# Patient Record
Sex: FEMALE | Race: WHITE | NOT HISPANIC OR LATINO | Employment: FULL TIME | URBAN - NONMETROPOLITAN AREA
[De-identification: names, ages, dates, MRNs, and addresses within clinical notes are randomized per-mention and may not be internally consistent; named-entity substitution may affect disease eponyms.]

---

## 2017-04-03 ENCOUNTER — HISTORY (OUTPATIENT)
Dept: FAMILY MEDICINE | Facility: OTHER | Age: 52
End: 2017-04-03

## 2017-04-03 ENCOUNTER — HOSPITAL ENCOUNTER (OUTPATIENT)
Dept: RADIOLOGY | Facility: OTHER | Age: 52
End: 2017-04-03
Attending: FAMILY MEDICINE

## 2017-04-03 ENCOUNTER — OFFICE VISIT - GICH (OUTPATIENT)
Dept: FAMILY MEDICINE | Facility: OTHER | Age: 52
End: 2017-04-03

## 2017-04-03 DIAGNOSIS — N81.10 CYSTOCELE, UNSPECIFIED (CODE): ICD-10-CM

## 2017-04-03 DIAGNOSIS — Z00.00 ENCOUNTER FOR GENERAL ADULT MEDICAL EXAMINATION WITHOUT ABNORMAL FINDINGS: ICD-10-CM

## 2017-04-03 DIAGNOSIS — Z12.4 ENCOUNTER FOR SCREENING FOR MALIGNANT NEOPLASM OF CERVIX: ICD-10-CM

## 2017-04-03 DIAGNOSIS — Z98.1 ARTHRODESIS STATUS: ICD-10-CM

## 2017-04-03 DIAGNOSIS — F41.1 GENERALIZED ANXIETY DISORDER: ICD-10-CM

## 2017-04-03 DIAGNOSIS — N95.0 POSTMENOPAUSAL BLEEDING: ICD-10-CM

## 2017-04-03 DIAGNOSIS — N39.490 OVERFLOW INCONTINENCE: ICD-10-CM

## 2017-04-03 DIAGNOSIS — Z12.31 ENCOUNTER FOR SCREENING MAMMOGRAM FOR MALIGNANT NEOPLASM OF BREAST: ICD-10-CM

## 2017-04-03 DIAGNOSIS — M54.50 LOW BACK PAIN: ICD-10-CM

## 2017-04-03 DIAGNOSIS — Z98.890 OTHER SPECIFIED POSTPROCEDURAL STATES: ICD-10-CM

## 2017-04-03 LAB
A/G RATIO - HISTORICAL: 1.4 (ref 1–2)
ABSOLUTE BASOPHILS - HISTORICAL: 0.1 THOU/CU MM
ABSOLUTE EOSINOPHILS - HISTORICAL: 0.2 THOU/CU MM
ABSOLUTE LYMPHOCYTES - HISTORICAL: 2.2 THOU/CU MM (ref 0.9–2.9)
ABSOLUTE MONOCYTES - HISTORICAL: 0.4 THOU/CU MM
ABSOLUTE NEUTROPHILS - HISTORICAL: 2.6 THOU/CU MM (ref 1.7–7)
ALBUMIN SERPL-MCNC: 4.4 G/DL (ref 3.5–5.7)
ALP SERPL-CCNC: 72 IU/L (ref 34–104)
ALT (SGPT) - HISTORICAL: 15 IU/L (ref 7–52)
AMORPHOUS - HISTORICAL: PRESENT
ANION GAP - HISTORICAL: 7 (ref 5–18)
AST SERPL-CCNC: 17 IU/L (ref 13–39)
BACTERIA URINE: ABNORMAL BACTERIA/HPF
BASOPHILS # BLD AUTO: 1.5 %
BILIRUB SERPL-MCNC: 0.5 MG/DL (ref 0.3–1)
BILIRUB UR QL: NEGATIVE
BUN SERPL-MCNC: 13 MG/DL (ref 7–25)
BUN/CREAT RATIO - HISTORICAL: 22
C-REACTIVE PROTEIN - HISTORICAL: <1 MG/DL
CALCIUM SERPL-MCNC: 9.7 MG/DL (ref 8.6–10.3)
CHLORIDE SERPLBLD-SCNC: 105 MMOL/L (ref 98–107)
CHOL/HDL RATIO - HISTORICAL: 4.52
CHOLESTEROL TOTAL: 226 MG/DL
CLARITY, URINE: ABNORMAL CLARITY
CO2 SERPL-SCNC: 26 MMOL/L (ref 21–31)
COLOR UR: YELLOW COLOR
CREAT SERPL-MCNC: 0.59 MG/DL (ref 0.7–1.3)
EOSINOPHIL NFR BLD AUTO: 3.9 %
EPITHELIAL CELLS: ABNORMAL EPI/HPF
ERYTHROCYTE [DISTWIDTH] IN BLOOD BY AUTOMATED COUNT: 12.2 % (ref 11.5–15.5)
ERYTHROCYTE [SEDIMENTATION RATE] IN BLOOD: 13 MM/HR
GFR IF NOT AFRICAN AMERICAN - HISTORICAL: >60 ML/MIN/1.73M2
GLOBULIN - HISTORICAL: 3.1 G/DL (ref 2–3.7)
GLUCOSE SERPL-MCNC: 83 MG/DL (ref 70–105)
GLUCOSE URINE: NEGATIVE MG/DL
HCT VFR BLD AUTO: 43.8 % (ref 33–51)
HDLC SERPL-MCNC: 50 MG/DL (ref 23–92)
HEMOGLOBIN: 14.6 G/DL (ref 12–16)
KETONES UR QL: NEGATIVE MG/DL
LDLC SERPL CALC-MCNC: 116 MG/DL
LEUKOCYTE ESTERASE URINE: NEGATIVE
LYMPHOCYTES NFR BLD AUTO: 39.4 % (ref 20–44)
MCH RBC QN AUTO: 28.9 PG (ref 26–34)
MCHC RBC AUTO-ENTMCNC: 33.3 G/DL (ref 32–36)
MCV RBC AUTO: 87 FL (ref 80–100)
MONOCYTES NFR BLD AUTO: 8.1 %
NEUTROPHILS NFR BLD AUTO: 47.2 % (ref 42–72)
NITRITE UR QL STRIP: NEGATIVE
NON-HDL CHOLESTEROL - HISTORICAL: 176 MG/DL
OCCULT BLOOD,URINE - HISTORICAL: NEGATIVE
PATIENT STATUS - HISTORICAL: ABNORMAL
PH UR: 8 [PH]
PLATELET # BLD AUTO: 380 THOU/CU MM (ref 140–440)
PMV BLD: 7.5 FL (ref 6.5–11)
POTASSIUM SERPL-SCNC: 3.9 MMOL/L (ref 3.5–5.1)
PROT SERPL-MCNC: 7.5 G/DL (ref 6.4–8.9)
PROTEIN QUALITATIVE,URINE - HISTORICAL: NEGATIVE MG/DL
RBC - HISTORICAL: ABNORMAL /HPF
RED BLOOD COUNT - HISTORICAL: 5.04 MIL/CU MM (ref 4–5.2)
SODIUM SERPL-SCNC: 138 MMOL/L (ref 133–143)
SP GR UR STRIP: 1.01
TRIGL SERPL-MCNC: 301 MG/DL
TSH - HISTORICAL: 2.36 UIU/ML (ref 0.34–5.6)
UROBILINOGEN,QUALITATIVE - HISTORICAL: NORMAL EU/DL
WBC - HISTORICAL: ABNORMAL /HPF
WHITE BLOOD COUNT - HISTORICAL: 5.6 THOU/CU MM (ref 4.5–11)

## 2017-04-04 ENCOUNTER — HOSPITAL ENCOUNTER (OUTPATIENT)
Dept: RADIOLOGY | Facility: OTHER | Age: 52
End: 2017-04-04
Attending: FAMILY MEDICINE

## 2017-04-04 ENCOUNTER — HISTORY (OUTPATIENT)
Dept: RADIOLOGY | Facility: OTHER | Age: 52
End: 2017-04-04

## 2017-04-04 DIAGNOSIS — Z12.4 ENCOUNTER FOR SCREENING FOR MALIGNANT NEOPLASM OF CERVIX: ICD-10-CM

## 2017-04-04 DIAGNOSIS — N95.0 POSTMENOPAUSAL BLEEDING: ICD-10-CM

## 2017-04-04 DIAGNOSIS — Z12.31 ENCOUNTER FOR SCREENING MAMMOGRAM FOR MALIGNANT NEOPLASM OF BREAST: ICD-10-CM

## 2017-04-04 DIAGNOSIS — Z00.00 ENCOUNTER FOR GENERAL ADULT MEDICAL EXAMINATION WITHOUT ABNORMAL FINDINGS: ICD-10-CM

## 2017-04-06 ENCOUNTER — AMBULATORY - GICH (OUTPATIENT)
Dept: FAMILY MEDICINE | Facility: OTHER | Age: 52
End: 2017-04-06

## 2017-08-24 ENCOUNTER — AMBULATORY - GICH (OUTPATIENT)
Dept: SCHEDULING | Facility: OTHER | Age: 52
End: 2017-08-24

## 2017-08-25 ENCOUNTER — AMBULATORY - GICH (OUTPATIENT)
Dept: SCHEDULING | Facility: OTHER | Age: 52
End: 2017-08-25

## 2018-01-04 NOTE — PROGRESS NOTES
Patient Information     Patient Name MRN Sex     Camilla Don [id number] Female 1965      Progress Notes signed by Jarrod Liu MD at 2017  2:46 PM      Author:  Jarrod Liu MD Service:  (none) Author Type:  Physician     Filed:  2017  2:46 PM Encounter Date:  2017 Status:  Signed     :  Jarrod Liu MD (Physician)            -  2017        CAMILLA DON  [id number] [address]      RE:  CAMILLA DON  MR#:  [id number]  :  1965    Dear Camilla,    I released your ultrasound report, and [facility] work [facility]. I just wanted to make sure you understand that the lining of the uterus was not seen well on that ultrasound. Because of that, I think you should see one of the gynecologists and have an endometrial biopsy done. This is a very simple procedure that can be done easily in the clinic. It is much the same as having a Pap test done nor having an IUD inserted.     If you would like to proceed, which I would suggest, please contact me and I can arrange an appointment for you.     Sincerely yours,         JARROD LIU MD    WR/km  Voice Job ID: [id number]  Text Job ID:  [id number]    cc:  2017  RE:  CAMILLA DON  MR#:  [id number]  Page 1

## 2018-01-04 NOTE — NURSING NOTE
Patient Information     Patient Name MRN Pat Hadley [id number] Female 1965      Nursing Note by Gissel Moreno at 4/3/2017 12:45 PM     Author:  Gissel Moreno Service:  (none) Author Type:  (none)     Filed:  4/3/2017  1:08 PM Encounter Date:  4/3/2017 Status:  Signed     :  Gissel Moreno            Patient presents to the clinic today for a px.  Gissel Moreno ....................  4/3/2017   12:58 PM

## 2018-01-04 NOTE — PROGRESS NOTES
Patient Information     Patient Name MRN Sex Pat Aiken [id number] Female 1965      Progress Notes by Mellisa Iglesias R.T. (ARRT) at 2017  3:17 PM     Author:  Mellisa Iglesias R.T. (ARRT) Service:  (none) Author Type:  (none)     Filed:  2017  3:17 PM Date of Service:  2017  3:17 PM Status:  Signed     :  Mellisa Iglesias R.T. (CRISPINT) ([facility] - Registered Radiologic Technologist)            Falls Risk Criteria:    Age 65 and older or under age 4        Sensory deficits    Poor vision    Use of ambulatory aides    Impaired judgment    Unable to walk independently    Meets High Risk criteria for falls:  no

## 2018-01-04 NOTE — PROGRESS NOTES
Patient Information     Patient Name MRN Pat Hadley [id number] Female 1965      Progress Notes by Darrion James MD at 4/3/2017 12:45 PM     Author:  Darrion James MD Service:  (none) Author Type:  Physician     Filed:  2017 12:19 PM Encounter Date:  4/3/2017 Status:  Signed     :  Darrion James MD (Physician)            HPI-Comes in today for comprehensive evaluation.  She has multiple things that she would like to discuss. First of all, she felt perimenopausal at about the age of 44. In 2014 she had a menstrual period in and had no more for another year and had multiple hot flashes. They restarted again in 2015 and were very heavy, and then continued for a couple of months and then stopped again for another year. Shortly after 2016 the menstrual period came again, then it stopped, and then since January she's had 3 normal periods a month apart. They are fairly heavy. There is no cramping or other associated symptoms. Twice, however, she has got a whole year without a menstrual period and did have hot flashes.    Secondly she has back pain which is constant. It seems to be worse when the menstrual period comes. She gets a clicking sensation sometimes in the back and gets pain that radiates to the hips bilaterally but not radicular into the legs. She had back surgery about 20 years ago with fusion of L3-4. She's had intermittent pain since that time but it's gradually gotten worse.    She's been doing Kirby exercises regularly but her bladder does leak in the morning and she has some overflow incontinence. She had 3 normal deliveries without complication. She has not had dysuria, frequency, but she does have urgency. She has not noted any bulging into the vagina.    She does have intermittent anxiety which is fairly well controlled just with meditation and walking. She can walk about 2 miles per day before her back 6 to heard.    She's had no  cardiopulmonary problems or any other associated symptoms.    ROS:  See HPI.  Other than as noted above. She has no other significant complaints, specifically no ENT, cardiopulmonary, GI, or other rheumatologic, hematologic, skin, lymph, neurologic or psychiatric issues.    Past Medical History:     Diagnosis  Date     Family history of colon cancer 10/2/2014     No Significant Past Medical History      Past Surgical History:      Procedure  Laterality Date     APPENDECTOMY  1985     CHOLECYSTECTOMY  1987     [place] COLONOSCOPY REMOVE JAYLENE POLYP LESN HOT BPSY FORCEP  10/07/2014    hyperplastic polyp--repeat 10 years       SPINAL FUSION  06/1997    L3/L4       TUBAL LIGATION       Family History      Problem  Relation Age of Onset     Cancer-colon Maternal Grandfather      Cancer-colon Maternal Aunt      Cancer-colon Maternal Aunt      Cancer-breast No Family History      Social History       Substance Use Topics         Smoking status:   Former Smoker     Smokeless tobacco:   Never Used     Alcohol use   Yes      Comment: occasional wine      No Known Allergies    Current Outpatient Prescriptions:      soy isofla-blk cohosh-mag bark (ESTROVEN) 155 mg cap, Take  by mouth once daily., Disp: , Rfl: 0     vit B complex with C #13-FA-D3 1-1,750 mg-unit TbDL, Take  by mouth once daily., Disp: , Rfl: 0  Medications have been reviewed by me and are current to the best of my knowledge and ability.      EXAM:she is a pleasant healthy-appearing 51 y.o. female in no apparent distress.  She answers questions appropriately and appears well-kempt.  HEENT exam is within normal limits.  .  Neck is supple with good carotid pulses.  No bruits are heard.  No thyromegaly or adenopathy.  Lungs are clear with good air movement.  No rales, rhonchi, or wheezes are heard--  Breasts are normal in appearance, palpably free of masses.    There is no skin dimpling or nipple changes and axilla are negative.  Cardiac exam reveals a regular rhythm with  no murmur or gallop appreciated.  Peripheral pulses are equal and strong. There is no peripheral edema.  Abdomen-soft and nontender with no hepatosplenomegaly or masses.  Bowel sounds are normal and no bruits are heard  Back is straight with no scoliosis or CVA tenderness.  Surgical scar is well-healed. There is fairly significant low back tenderness directly over the scar at all levels.   exam reveals normal external genitalia.  Vagina is normal. With Valsalva, there is a moderate cystocele. Cervix is parous, but normal in appearance. Pap smear was done..  Uterus is midline, mobile and nontender..  Both adnexa are normal.     Extremities no edema. Good distal pulses. Straight leg raising is negative.  Neurologic exam is intact and nonfocal  Skin is free of significant lesions.  No rashes are seen  Lymph nodes are nonpalpable in cervical, axillary, and inguinal areas.  Psychiatric: Alert and oriented with normal mood and affect    Lab-labs are pending  Imaging-lumbosacral spine x-rays were obtained and show an intact fusion. Above the fusion there is degeneration of the disc. Mammogram is scheduled.     Assessment-preventive healthcare-normal exam. Mammogram is scheduled. Colonoscopy was done in 2014 and is due again in 2024. Tetanus is up-to-date. She declined flu vaccine or Prevnar.    History of back surgery and persistent back pain. X-rays do show an abnormality of the disc above the level of her fusion. Recommended physical therapy and check lab work and if symptoms unimproved consider further evaluation. Reviewed her back x-rays with her.    History of irregular bleeding following a year of no bleeding-postmenopausal bleeding. Recommended proceeding with ultrasound and a Pap test was done.    History of overflow incontinence and cystocele. At this point her symptoms are manageable and she would just like to watch and wait and see what happens but I did suggest GYN consultation if symptoms  worsen.    Plan-laboratory studies done, she'll be notified of the result. The same is true of Pap test, pelvic ultrasound, and mammogram. We will follow-up again pending completion of all of these studies.    Darrion James MD ....................  4/4/2017   12:10 PM

## 2018-01-17 ENCOUNTER — COMMUNICATION - GICH (OUTPATIENT)
Dept: FAMILY MEDICINE | Facility: OTHER | Age: 53
End: 2018-01-17

## 2018-01-18 ENCOUNTER — AMBULATORY - GICH (OUTPATIENT)
Dept: RADIOLOGY | Facility: OTHER | Age: 53
End: 2018-01-18

## 2018-01-27 VITALS
HEIGHT: 61 IN | WEIGHT: 169 LBS | BODY MASS INDEX: 31.91 KG/M2 | DIASTOLIC BLOOD PRESSURE: 84 MMHG | SYSTOLIC BLOOD PRESSURE: 138 MMHG

## 2018-01-29 ENCOUNTER — DOCUMENTATION ONLY (OUTPATIENT)
Dept: FAMILY MEDICINE | Facility: OTHER | Age: 53
End: 2018-01-29

## 2018-01-29 PROBLEM — M54.50 LUMBAR SPINE PAIN: Status: ACTIVE | Noted: 2017-04-03

## 2018-01-29 PROBLEM — Z98.1 S/P LUMBAR SPINAL FUSION: Status: ACTIVE | Noted: 2017-04-03

## 2018-02-13 NOTE — TELEPHONE ENCOUNTER
Patient Information     Patient Name MRN Pat Hadley [id number] Female 1965      Telephone Encounter by Юлия Ortiz RN at 2018 10:48 AM     Author:  Юлия Ortiz RN  Service:  (none) Author Type:  NURS- Registered Nurse     Filed:  2018 11:50 AM  Encounter Date:  2018 Status:  Addendum     :  Юлия Ortiz RN (NURS- Registered Nurse)        Related Notes: Original Note by Юлия Ortiz RN (NURS- Registered Nurse) filed at 2018 10:50 AM            Left message with pt.  Refill request for Metoprolol with no record on file.  Pt has not been  Seen since .  Left message to call back  ....................Юлия Ortiz RN  2018   10:50 AM       Pt states due to insurance she had to switch clinics and medication has been filled by other primary.  Юлия Ortiz RN ....................  2018   11:50 AM

## 2019-06-04 ENCOUNTER — TRANSFERRED RECORDS (OUTPATIENT)
Dept: HEALTH INFORMATION MANAGEMENT | Facility: CLINIC | Age: 54
End: 2019-06-04

## 2020-05-01 ENCOUNTER — ALLIED HEALTH/NURSE VISIT (OUTPATIENT)
Dept: FAMILY MEDICINE | Facility: OTHER | Age: 55
End: 2020-05-01
Attending: FAMILY MEDICINE
Payer: COMMERCIAL

## 2020-05-01 VITALS — TEMPERATURE: 97.3 F

## 2020-05-01 DIAGNOSIS — Z20.822 EXPOSURE TO COVID-19 VIRUS: Primary | ICD-10-CM

## 2020-05-01 DIAGNOSIS — Z20.822 COVID-19 RULED OUT: Primary | ICD-10-CM

## 2020-05-01 PROCEDURE — 99207 ZZC NO CHARGE NURSE ONLY: CPT

## 2020-05-01 PROCEDURE — 99441 ZZC PHYSICIAN TELEPHONE EVALUATION 5-10 MIN: CPT | Mod: TEL | Performed by: FAMILY MEDICINE

## 2020-05-01 PROCEDURE — 87635 SARS-COV-2 COVID-19 AMP PRB: CPT | Mod: ZL | Performed by: FAMILY MEDICINE

## 2020-05-01 RX ORDER — PAROXETINE 10 MG/1
30 TABLET, FILM COATED ORAL EVERY MORNING
COMMUNITY
Start: 2019-10-02 | End: 2024-03-05

## 2020-05-01 RX ORDER — ATORVASTATIN CALCIUM 10 MG/1
TABLET, FILM COATED ORAL
Status: ON HOLD | COMMUNITY
Start: 2019-05-30 | End: 2024-02-28

## 2020-05-01 RX ORDER — NITROGLYCERIN 0.4 MG/1
0.4 TABLET SUBLINGUAL EVERY 5 MIN PRN
COMMUNITY
Start: 2018-08-22 | End: 2024-04-09

## 2020-05-01 RX ORDER — METOPROLOL TARTRATE 25 MG/1
TABLET, FILM COATED ORAL
COMMUNITY
Start: 2019-05-30

## 2020-05-01 ASSESSMENT — ENCOUNTER SYMPTOMS
SHORTNESS OF BREATH: 0
MYALGIAS: 0
CHILLS: 0
RHINORRHEA: 0
SORE THROAT: 0
FEVER: 0

## 2020-05-01 NOTE — PROGRESS NOTES
"Pat Tobias is a 54 year old female who is being evaluated via a billable telephone visit.      The patient has been notified of following:     \"This telephone visit will be conducted via a call between you and your physician/provider. We have found that certain health care needs can be provided without the need for a physical exam.  This service lets us provide the care you need with a short phone conversation.  If a prescription is necessary we can send it directly to your pharmacy.  If lab work is needed we can place an order for that and you can then stop by our lab to have the test done at a later time.    Telephone visits are billed at different rates depending on your insurance coverage. During this emergency period, for some insurers they may be billed the same as an in-person visit.  Please reach out to your insurance provider with any questions.    If during the course of the call the physician/provider feels a telephone visit is not appropriate, you will not be charged for this service.\"    Patient has given verbal consent for Telephone visit?  Yes    How would you like to obtain your AVS? Mail a copy    Subjective     Pat Tobias is a 54 year old female who presents to clinic today for the following health issues:    HPI    Concerned about COVID-19 exposure.  She is employed as a  at [employer] where several individuals have tested positive.  She reports exposure to a positive patient in the day prior to his test results returning.  She reports that she was wearing her regular eyeglasses and a surgical facemask.  The patient was unmasked.  She reports a cough which has been present since December and has not changed and intermittent headaches which are unchanged from usual.    Patient Active Problem List   Diagnosis     Family history of colon cancer     Lumbar spine pain     Neck pain     Post-nasal drainage     S/P lumbar spinal fusion     Past Surgical History:   Procedure Laterality " Date     APPENDECTOMY OPEN      1985     CHOLECYSTECTOMY      1987     COLONOSCOPY  10/07/2014    hyperplastic polyp--repeat 10 years     LAPAROSCOPIC TUBAL LIGATION      No Comments Provided     OTHER SURGICAL HISTORY      06/1997,[id number],SPINAL FUSION,L3/L4       Social History     Tobacco Use     Smoking status: Former Smoker     Smokeless tobacco: Never Used   Substance Use Topics     Alcohol use: Yes     Comment: Alcoholic Drinks/day: occasional wine     Family History   Problem Relation Age of Onset     Colon Cancer Maternal Grandfather         Cancer-colon     Colon Cancer Maternal Aunt         Cancer-colon     Colon Cancer Maternal Aunt         Cancer-colon     Breast Cancer No family hx of         Cancer-breast         Current Outpatient Medications   Medication Sig Dispense Refill     atorvastatin (LIPITOR) 10 MG tablet TAKE 1 TABLET (10MG) BY MOUTH ONCE DAILY WITH SUPPER.       metoprolol tartrate (LOPRESSOR) 25 MG tablet TAKE 1/2 TABLET (12.5MG) BY MOUTH TWICE A DAY       nitroGLYcerin (NITROSTAT) 0.4 MG sublingual tablet Place 0.4 mg under the tongue       PARoxetine (PAXIL) 10 MG tablet Take 10 mg by mouth       SOYBEAN PO        acetaminophen 167 MG/5ML elixir Take 500 mg by mouth       B Complex Vitamins (VITAMIN B COMPLEX PO)        Allergies not on file    Reviewed and updated as needed this visit by Provider    Review of Systems   Constitutional: Negative for chills and fever.   HENT: Negative for congestion, rhinorrhea and sore throat.    Respiratory: Negative for shortness of breath.    Musculoskeletal: Negative for myalgias.      Objective   Reported vitals:  There were no vitals taken for this visit.   alert and no distress  PSYCH: Alert and oriented times 3; coherent speech, normal   rate and volume, able to articulate logical thoughts, able   to abstract reason, no tangential thoughts, no hallucinations   or delusions  Her affect is normal  RESP: No cough, no audible wheezing, able to talk  in full sentences  Remainder of exam unable to be completed due to telephone visits    Diagnostic Test Results:  Labs reviewed in Epic    Assessment/Plan:  1. Exposure to Covid-19 Virus  COVID-19 test ordered.  Counseled on self- and household-quarantine for 14 days, symptom management, and emergent symptoms.  If her household contacts develop symptoms, they should also be tested.  - COVID-19 Virus (Coronavirus) by PCR Nasopharyngeal swab    Phone call duration:  6 minutes    Sarah Kim DO

## 2020-05-02 LAB
SARS-COV-2 RNA SPEC QL NAA+PROBE: NOT DETECTED
SPECIMEN SOURCE: NORMAL

## 2020-05-23 ENCOUNTER — ALLIED HEALTH/NURSE VISIT (OUTPATIENT)
Dept: FAMILY MEDICINE | Facility: OTHER | Age: 55
End: 2020-05-23
Attending: PHYSICIAN ASSISTANT
Payer: COMMERCIAL

## 2020-05-23 DIAGNOSIS — Z20.822 EXPOSURE TO COVID-19 VIRUS: Primary | ICD-10-CM

## 2020-05-23 DIAGNOSIS — Z20.822 SUSPECTED COVID-19 VIRUS INFECTION: Primary | ICD-10-CM

## 2020-05-23 PROCEDURE — 99213 OFFICE O/P EST LOW 20 MIN: CPT | Mod: 95 | Performed by: PHYSICIAN ASSISTANT

## 2020-05-23 PROCEDURE — 99207 ZZC NO CHARGE NURSE ONLY: CPT

## 2020-05-23 PROCEDURE — U0003 INFECTIOUS AGENT DETECTION BY NUCLEIC ACID (DNA OR RNA); SEVERE ACUTE RESPIRATORY SYNDROME CORONAVIRUS 2 (SARS-COV-2) (CORONAVIRUS DISEASE [COVID-19]), AMPLIFIED PROBE TECHNIQUE, MAKING USE OF HIGH THROUGHPUT TECHNOLOGIES AS DESCRIBED BY CMS-2020-01-R: HCPCS | Mod: ZL | Performed by: PHYSICIAN ASSISTANT

## 2020-05-23 ASSESSMENT — PAIN SCALES - GENERAL: PAINLEVEL: MODERATE PAIN (4)

## 2020-05-23 NOTE — NURSING NOTE
"Chief Complaint   Patient presents with     Suspected Covid     She is in close contact with someone who got a positive test results yesterday.    Initial LMP  (LMP Unknown)   Breastfeeding No  Estimated body mass index is 32.2 kg/m  as calculated from the following:    Height as of 4/3/17: 1.543 m (5' 0.75\").    Weight as of 4/3/17: 76.7 kg (169 lb).    Medication Reconciliation: complete      Peri Augustin LPN  "

## 2020-05-23 NOTE — PROGRESS NOTES
"Pat Tobias is a 54 year old female who is being evaluated via a billable telephone visit.      The patient has been notified of following: Peri Augustin LPN .......  5/23/2020  10:49 AM      \"This telephone visit will be conducted via a call between you and your physician/provider. We have found that certain health care needs can be provided without the need for a physical exam.  This service lets us provide the care you need with a short phone conversation.  If a prescription is necessary we can send it directly to your pharmacy.  If lab work is needed we can place an order for that and you can then stop by our lab to have the test done at a later time.    Telephone visits are billed at different rates depending on your insurance coverage. During this emergency period, for some insurers they may be billed the same as an in-person visit.  Please reach out to your insurance provider with any questions.    If during the course of the call the physician/provider feels a telephone visit is not appropriate, you will not be charged for this service.\"    Patient has given verbal consent for Telephone visit?  Yes    What phone number would you like to be contacted at? [phone] or [phone]    How would you like to obtain your AVS? 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     Associated symptoms:   Runny nose yesterday - seasonal allergies/mild dry cough  Sore throat, mild left ear discomfort  Symptoms are mild    No fever, headache, body aches, no cough, no shortness of breath    No tobacco use  No asthma      Patient " Active Problem List   Diagnosis     Family history of colon cancer     Lumbar spine pain     Neck pain     Post-nasal drainage     S/P lumbar spinal fusion     Past Surgical History:   Procedure Laterality Date     APPENDECTOMY OPEN      1985     CHOLECYSTECTOMY      1987     COLONOSCOPY  10/07/2014    hyperplastic polyp--repeat 10 years     LAPAROSCOPIC TUBAL LIGATION      No Comments Provided     OTHER SURGICAL HISTORY      06/1997,[id number],SPINAL FUSION,L3/L4       Social History     Tobacco Use     Smoking status: Former Smoker     Smokeless tobacco: Never Used   Substance Use Topics     Alcohol use: Yes     Comment: Alcoholic Drinks/day: occasional wine     Family History   Problem Relation Age of Onset     Colon Cancer Maternal Grandfather         Cancer-colon     Colon Cancer Maternal Aunt         Cancer-colon     Colon Cancer Maternal Aunt         Cancer-colon     Breast Cancer No family hx of         Cancer-breast         Current Outpatient Medications   Medication Sig Dispense Refill     acetaminophen 167 MG/5ML elixir Take 500 mg by mouth       atorvastatin (LIPITOR) 10 MG tablet TAKE 1 TABLET (10MG) BY MOUTH ONCE DAILY WITH SUPPER.       B Complex Vitamins (VITAMIN B COMPLEX PO)        metoprolol tartrate (LOPRESSOR) 25 MG tablet TAKE 1/2 TABLET (12.5MG) BY MOUTH TWICE A DAY       nitroGLYcerin (NITROSTAT) 0.4 MG sublingual tablet Place 0.4 mg under the tongue       PARoxetine (PAXIL) 10 MG tablet Take 10 mg by mouth       SOYBEAN PO        No Known Allergies           Review of Systems   CONSTITUTIONAL: NEGATIVE for fever, chills, change in weight  INTEGUMENTARY/SKIN: NEGATIVE for rash  ENT/MOUTH: POSITIVE for runny nose, mild left ear pain  RESP: NEGATIVE for cough or SOB  CV: NEGATIVE for chest pain, palpitations or peripheral edema  GI: NEGATIVE for nausea, abdominal pain, heartburn, or change in bowel habits  MUSCULOSKELETAL: NEGATIVE for significant arthralgias or myalgia       Objective   Reported  vitals:  LMP  (LMP Unknown)    PSYCH: Alert and oriented times 3; coherent speech, normal   rate and volume, able to articulate logical thoughts, able   to abstract reason, no tangential thoughts, no hallucinations   or delusions  Her affect is normal  RESP: No cough, no audible wheezing, able to talk in full sentences  Remainder of exam unable to be completed due to telephone visits    COVID-19 screen pending    Assessment/Plan:  1. Exposure to Covid-19 Virus  Exposure to coworker that she has close contact and recently tested positive for COVID-19.  Has mild runny nose and mild left ear pain.  No cough, shortness of breath, chest pain, cough, change in taste/smell, abdominal discomfort, diarrhea, rash, body aches  COVID-19 screen in place.  Patient will be notified of results  Encourage 14 days self quarantine after her last exposure to this person, self isolation per CDC guidelines.  Consult human resources at her place of employment to determine appropriate time to return to work.   Return to clinic if she becomes symptomatic.   - Asymptomatic COVID-19 Virus (Coronavirus) by PCR  - SARS-CoV-2 COVID-19 Virus (Coronavirus) RT-PCR Nasopharyngeal    No follow-ups on file.      Phone call duration:  8 minutes    Aminah Brock PA-C

## 2020-05-24 ENCOUNTER — TELEPHONE (OUTPATIENT)
Dept: FAMILY MEDICINE | Facility: OTHER | Age: 55
End: 2020-05-24

## 2020-05-24 LAB
SARS-COV-2 PCR COMMENT: ABNORMAL
SARS-COV-2 RNA SPEC QL NAA+PROBE: NORMAL
SARS-COV-2 RNA SPEC QL NAA+PROBE: POSITIVE
SPECIMEN SOURCE: ABNORMAL
SPECIMEN SOURCE: NORMAL

## 2020-05-24 NOTE — TELEPHONE ENCOUNTER
Coronavirus (COVID-19) Notification    Reason for call  Notify of POSITIVE  COVID-19 lab result, assess symptoms,  review [facility] recommendations    Lab Result   Lab test for 2019-nCoV rRt-PCR or SARS-COV-2 PCR  Oropharyngeal AND/OR nasopharyngeal swabs were POSITIVE for 2019-nCoV RNA [OR] SARS-COV-2 RNA (COVID-19) RNA     We have been unable to reach Patient by phone at this time to notify of their Positive COVID-19 result.  Left voicemail message requesting a call back between 8 am to 6:30 p.m. to [phone] [facility] for results.   (Weekends, this line is available from 10A to 6:30P)     POSITIVE COVID-19 Letter sent.      Alexa Jalloh RN  Covid-19 Results Team  [phone]

## 2020-05-24 NOTE — TELEPHONE ENCOUNTER
"Coronavirus (COVID-19) Notification    Patient  Pat Tobias    Reason for call  Notify of Positive Coronavirus (COVID-19) lab results, assess symptoms,  review [facility] recommendations    Lab Result    Lab test:  2019-nCoV rRt-PCR or SARS-CoV-2 PCR    Oropharyngeal AND/OR nasopharyngeal swabs is POSITIVE for 2019-nCoV RNA/SARS-COV-2 PCR (COVID-19 virus)    RN Recommendations/Instructions per [facility] Coronavirus COVID-19 recommendations    Brief introduction script  Hi, My name is Radhika and I am calling on behalf of [facility].  We were notified that your Coronavirus test (COVID-19) for was POSITIVE for the virus.  I have some information to relay to you but first I wanted to mention that the [place] Dept of Health will be contacting you shortly [it's possible MD already called Patient] to talk to you more about how you are feeling and other people you have had contact with who might now also have the virus.  Also, [facility] is Partnering with the [facility] for Covid-19 research, you may be contacted directly by research staff.    Assessment (Inquire about Patient's current symptoms)  Pat has been \"tired\", had runny nose on Friday and still remains today, minor sore throat.                    If at time of call, Patients symptoms hare worsened, the Patient should contact 911 or have someone drive them to Emergency Dept promptly:      If Patient calling 911, inform 911 personal that you have tested positive for the Coronavirus (COVID-19).  Place mask on and await 911 to arrive.    If Emergency Dept, If possible, please have another adult drive you to the Emergency Dept but you need to wear mask when in contact with other people.      Review information with Patient:    Since you tested POSITIVE for the COVID-19 virus, it is important that you protect others from being exposed and infected with this virus.    [For safety, it's very important to follow these rules.]    First, stay " home and away from others (self-isolate) until:    You've had no fever--and no medicine that reduces fever--for 3 full days (72 hours). And      Your other symptoms have gotten better. For example, your cough or breathing has improved. And     At least 10 days have passed since your symptoms started.      During this time:    Stay in your own room (and use your own bathroom), if you can.    Stay away from others in your home. No hugging, kissing or shaking hands.    Don't let anyone visit.    Don't go to work, school or anywhere else.     Clean  high touch  surfaces often (doorknobs, counters, handles, etc.). Use a household cleaning spray or wipes.    Cover your mouth and nose with a mask, tissue or washcloth to avoid spreading germs.    Wash your hands and face often with soap and water.    You should not go back to work until you meet the guidelines above for ending your home isolation. You should meet these along with any other guidelines that your employer has.  Employers: This document serves as formal notice of your employee's medical guidelines for going back to work. They must meet the above guidelines before going back to work in person.      How can I take care of myself?  1. Get lots of rest. Drink extra fluids (unless a doctor has told you not to).    2. Take Tylenol (acetaminophen) for fever or pain. If you have liver or kidney problems, ask your family doctor if it's okay to take Tylenol.     Take either:     650 mg (two 325 mg pills) every 4 to 6 hours, or     1,000 mg (two 500 mg pills) every 8 hours as needed.     Note: Don't take more than 3,000 mg in one day. Acetaminophen is found in many medicines (both prescribed and over-the-counter medicines). Read all labels to be sure you don't take too much.  For children, check the Tylenol bottle for the right dose. The dose is based on the child's age or weight.  3. If you have other health problems (like cancer, heart failure, an organ transplant or  severe kidney disease): Call your specialty clinic if you don't feel better in the next 2 days.    4. Know when to call 911: If your breathing is so bad that it keeps you from doing normal activities, call 911 or go to the emergency room. Tell them that you've been staying home and may have COVID-19.    5. Sign up for [employer]. We know it's scary to hear that you have COVID-19. We want to track your symptoms to make sure you're okay over the next 2 weeks. Please look for an email from [employer]--this is a free, online program that we'll use to keep in touch. To sign up, follow the link in the email. Learn more at [email or web address].      Where can I get more information?    To learn the [place]'s guidelines for staying home, please visit the [facility] website at [email or web address]/diseases/coronavirus/basics.html.    To learn more about COVID-19 and how to care for yourself at home, please visit the CDC website at https://www.cdc.gov/coronavirus/2019-ncov/about/steps-when-sick.html.    For more options for care at [facility], please visit our website at [email or web address]      [place] Dept of Health ([facility]) COVID-19 Hotline:   [phone]    Positive COVID-19 letter sent (Yes/No):  YES    Radhika Harry, LEILA    [employer] [employer] [facility]   Lung Nodule and ED Lab Results F/U RN  Epic pool (ED late result f/u RN) : [id number]   # [phone]

## 2020-06-03 ENCOUNTER — VIRTUAL VISIT (OUTPATIENT)
Dept: FAMILY MEDICINE | Facility: OTHER | Age: 55
End: 2020-06-03
Attending: CHIROPRACTOR
Payer: COMMERCIAL

## 2020-06-03 DIAGNOSIS — R05.9 COUGH: ICD-10-CM

## 2020-06-03 DIAGNOSIS — U07.1 CLINICAL DIAGNOSIS OF COVID-19: Primary | ICD-10-CM

## 2020-06-03 PROCEDURE — 99213 OFFICE O/P EST LOW 20 MIN: CPT | Mod: 95 | Performed by: CHIROPRACTOR

## 2020-06-03 NOTE — PROGRESS NOTES
"Pat Tobias is a 54 year old female who is being evaluated via a billable video visit.      Nursing Notes:   Joie Borjas LPN  6/3/2020 10:12 AM  Signed  Patient presenting for a video visit today to get a letter to return to work after diagnosis of COVID-19 on  5/23/20.  Medication Reconciliation: donna Borjas LPN  6/3/2020 10:04 AM    The patient has been notified of following:     \"This video visit will be conducted via a call between you and your physician/provider. We have found that certain health care needs can be provided without the need for an in-person physical exam.  This service lets us provide the care you need with a video conversation.  If a prescription is necessary we can send it directly to your pharmacy.  If lab work is needed we can place an order for that and you can then stop by our lab to have the test done at a later time.    Video visits are billed at different rates depending on your insurance coverage.  Please reach out to your insurance provider with any questions.    If during the course of the call the physician/provider feels a video visit is not appropriate, you will not be charged for this service.\"    Patient has given verbal consent for Video visit? Yes    How would you like to obtain your AVS? [facility]    Patient would like the video invitation sent by: Text to cell phone: [phone]    Will anyone else be joining your video visit? No    Subjective     Pat Tobias is a 54 year old female who presents today via video visit for the following health issues:RTW    HPI    How are you feeling today? Much better  In the past 24 hours have you had shortness of breath when speaking, walking, or climbing stairs? I don't have breathing problems  Do you have a cough? Yes, I have a cough but it's not worse  When is the last time you had a fever greater than 100? 5/31/2020  Are you having any other symptoms? None   Do you have any other stressors you would like " "to discuss with your provider? No    She states today \"I feel like I am playing hookie from work\"    Positive for Covid-19 on 5/23/2020    Positive for ongoing mild cough - improved  Denies: Fever since 5/31/2020, New or worsened cough, Difficulty breathing or hypoxemia, New unexplained profound myalgia, rigors, new headache, sore throat, lost of smell/taste, chest pain, diarrhea    Video Start Time: 10:41 AM        Review of Systems   Constitutional, HEENT, cardiovascular, pulmonary, gi and gu systems are negative, except as otherwise noted.      Objective    LMP  (LMP Unknown)   Estimated body mass index is 32.2 kg/m  as calculated from the following:    Height as of 4/3/17: 1.543 m (5' 0.75\").    Weight as of 4/3/17: 76.7 kg (169 lb).  Physical Exam     GENERAL: Healthy, alert and no distress  EYES: Eyes grossly normal to inspection.  No discharge or erythema, or obvious scleral/conjunctival abnormalities.  RESP: No audible wheeze, cough, or visible cyanosis.  No visible retractions or increased work of breathing.    SKIN: Visible skin clear. No significant rash, abnormal pigmentation or lesions.  NEURO: Cranial nerves grossly intact.  Mentation and speech appropriate for age.  PSYCH: Mentation appears normal, affect normal/bright, judgement and insight intact, normal speech and appearance well-groomed.      Diagnostic Test Results:  Labs reviewed in Epic        Assessment & Plan     Due to cough, will have a follow up visit with her on Monday which will be 14 days.  She was concerned with returning to work without testing, as was told by her employer, but I explained in detail the testing protocols that were updated yesterday.  I confirmed this with Haydee De Paz in the command center and Haydee will be reaching out to [place]'s employer to discuss testing protocols.     Mandeep Handley DC  [facility]      Video-Visit Details    Type of service:  Video Visit    Video End Time:11:11 " AM    Originating Location (pt. Location): Home    Distant Location (provider location):  [facility]     Platform used for Video Visit: [employer] with phone call follow up due to connectivity issues.

## 2020-06-03 NOTE — NURSING NOTE
Patient presenting for a video visit today to get a letter to return to work after diagnosis of COVID-19 on  5/23/20.  Medication Reconciliation: complete    Joie Borjas LPN  6/3/2020 10:04 AM

## 2020-06-08 ENCOUNTER — VIRTUAL VISIT (OUTPATIENT)
Dept: FAMILY MEDICINE | Facility: OTHER | Age: 55
End: 2020-06-08
Attending: CHIROPRACTOR
Payer: COMMERCIAL

## 2020-06-08 DIAGNOSIS — U07.1 CLINICAL DIAGNOSIS OF COVID-19: Primary | ICD-10-CM

## 2020-06-08 PROCEDURE — 99213 OFFICE O/P EST LOW 20 MIN: CPT | Mod: 95 | Performed by: CHIROPRACTOR

## 2020-06-08 NOTE — PROGRESS NOTES
"Pat Tobias is a 54 year old female who is being evaluated via a billable telephone visit.  Patient was unable to be contacted by video visit.    Nursing Notes:   Joie Borjas LPN  6/8/2020  8:09 AM  Signed  Patient did not answer. Left 1st message that we kashif call back jesika few minutes.  Joie Borjas LPN  6/8/2020 8:08 AM     Joie Borjas LPN  6/8/2020  8:17 AM  Signed  Patient is wanting a return to work letter after diagnosis of COVID-19. She has had no symptoms since 6/3/20.  Medication Reconciliation: complete    Joie Borjas LPN  6/8/2020 8:15 AM       The patient has been notified of following:     \"This telephone visit will be conducted via a call between you and your physician/provider. We have found that certain health care needs can be provided without the need for a physical exam.  This service lets us provide the care you need with a short phone conversation.  If a prescription is necessary we can send it directly to your pharmacy.  If lab work is needed we can place an order for that and you can then stop by our lab to have the test done at a later time.    Telephone visits are billed at different rates depending on your insurance coverage. During this emergency period, for some insurers they may be billed the same as an in-person visit.  Please reach out to your insurance provider with any questions.    If during the course of the call the physician/provider feels a telephone visit is not appropriate, you will not be charged for this service.\"    Patient has given verbal consent for Telephone visit?  Yes    What phone number would you like to be contacted at? [phone]    How would you like to obtain your AVS? [employer]    Subjective     Pat Tobias is a 54 year old female who presents via phone visit today for the following health issues: RTW    HPI  {  Symptom from since 6/3/2020.  Last known fever was still 5/31/2020.  She is driving to work as I speak with her.  No " symptoms and no complaints.  Feeling healthy and excited to return to work and happy that we took an extra couple days beyond the 10 symptom RTW guidelines.         Review of Systems   Constitutional, HEENT, cardiovascular, pulmonary, GI, , musculoskeletal, neuro, skin, endocrine and psych systems are negative, except as otherwise noted.       Objective   Reported vitals:  LMP  (LMP Unknown)    healthy, alert and no distress  PSYCH: Alert and oriented times 3; coherent speech, normal   rate and volume, able to articulate logical thoughts, able   to abstract reason, no tangential thoughts, no hallucinations   or delusions  Her affect is normal  RESP: No cough, no audible wheezing, able to talk in full sentences  Remainder of exam unable to be completed due to telephone visits    Diagnostic Test Results:  Labs reviewed in Epic        Assessment/Plan:  She is capable of RTW at full capacity and without restrictions.  Has met and has gone beyond the symptoms based RTW guidelines.  Instructed to contact us with any development of symptoms or changes in health status.    No follow-ups on file.      Phone call duration:  6 minutes    Mandeep Handley DC  [facility]

## 2020-06-08 NOTE — NURSING NOTE
Patient did not answer. Left 1st message that we kashif call back jesika few minutes.  Joie Borjas LPN  6/8/2020 8:08 AM

## 2020-06-08 NOTE — NURSING NOTE
Patient is wanting a return to work letter after diagnosis of COVID-19. She has had no symptoms since 6/3/20.  Medication Reconciliation: complete    Joie Borjas LPN  6/8/2020 8:15 AM

## 2020-08-01 ENCOUNTER — TRANSFERRED RECORDS (OUTPATIENT)
Dept: MULTI SPECIALTY CLINIC | Facility: CLINIC | Age: 55
End: 2020-08-01

## 2020-09-28 ENCOUNTER — RESULTS ONLY (OUTPATIENT)
Dept: LAB | Age: 55
End: 2020-09-28

## 2020-09-29 LAB
SARS-COV-2 RNA SPEC QL NAA+PROBE: NOT DETECTED
SPECIMEN SOURCE: NORMAL

## 2021-06-11 ENCOUNTER — TRANSFERRED RECORDS (OUTPATIENT)
Dept: MULTI SPECIALTY CLINIC | Facility: CLINIC | Age: 56
End: 2021-06-11

## 2021-06-11 LAB
HPV ABSTRACT: NORMAL
PAP-ABSTRACT: NORMAL

## 2024-02-27 ENCOUNTER — APPOINTMENT (OUTPATIENT)
Dept: MRI IMAGING | Facility: OTHER | Age: 59
End: 2024-02-27
Attending: EMERGENCY MEDICINE
Payer: COMMERCIAL

## 2024-02-27 ENCOUNTER — APPOINTMENT (OUTPATIENT)
Dept: CT IMAGING | Facility: OTHER | Age: 59
End: 2024-02-27
Attending: EMERGENCY MEDICINE
Payer: COMMERCIAL

## 2024-02-27 ENCOUNTER — HOSPITAL ENCOUNTER (OUTPATIENT)
Facility: OTHER | Age: 59
Setting detail: OBSERVATION
LOS: 1 days | Discharge: HOME | End: 2024-02-28
Attending: EMERGENCY MEDICINE | Admitting: HOSPITALIST
Payer: COMMERCIAL

## 2024-02-27 DIAGNOSIS — I25.2 OLD MYOCARDIAL INFARCTION: Primary | ICD-10-CM

## 2024-02-27 DIAGNOSIS — M54.2 NECK PAIN: ICD-10-CM

## 2024-02-27 DIAGNOSIS — G45.9 TIA (TRANSIENT ISCHEMIC ATTACK): ICD-10-CM

## 2024-02-27 DIAGNOSIS — M54.50 LUMBAR SPINE PAIN: ICD-10-CM

## 2024-02-27 DIAGNOSIS — R29.90 EPISODE OF TRANSIENT NEUROLOGIC SYMPTOMS: ICD-10-CM

## 2024-02-27 DIAGNOSIS — Z98.1 S/P LUMBAR SPINAL FUSION: ICD-10-CM

## 2024-02-27 LAB
ANION GAP SERPL CALCULATED.3IONS-SCNC: 12 MMOL/L (ref 7–15)
APTT PPP: 29 SECONDS (ref 22–38)
BASOPHILS # BLD AUTO: 0.1 10E3/UL (ref 0–0.2)
BASOPHILS NFR BLD AUTO: 1 %
BUN SERPL-MCNC: 13.3 MG/DL (ref 8–23)
CALCIUM SERPL-MCNC: 9.4 MG/DL (ref 8.6–10)
CHLORIDE SERPL-SCNC: 104 MMOL/L (ref 98–107)
CREAT SERPL-MCNC: 0.6 MG/DL (ref 0.51–0.95)
DEPRECATED HCO3 PLAS-SCNC: 28 MMOL/L (ref 22–29)
EGFRCR SERPLBLD CKD-EPI 2021: >90 ML/MIN/1.73M2
EOSINOPHIL # BLD AUTO: 0.2 10E3/UL (ref 0–0.7)
EOSINOPHIL NFR BLD AUTO: 3 %
ERYTHROCYTE [DISTWIDTH] IN BLOOD BY AUTOMATED COUNT: 13.2 % (ref 10–15)
GLUCOSE BLDC GLUCOMTR-MCNC: 119 MG/DL (ref 70–99)
GLUCOSE SERPL-MCNC: 113 MG/DL (ref 70–99)
HCT VFR BLD AUTO: 41.4 % (ref 35–47)
HGB BLD-MCNC: 13.3 G/DL (ref 11.7–15.7)
HOLD SPECIMEN: NORMAL
IMM GRANULOCYTES # BLD: 0 10E3/UL
IMM GRANULOCYTES NFR BLD: 0 %
INR PPP: 0.98 (ref 0.85–1.15)
LYMPHOCYTES # BLD AUTO: 2.3 10E3/UL (ref 0.8–5.3)
LYMPHOCYTES NFR BLD AUTO: 30 %
MCH RBC QN AUTO: 28.7 PG (ref 26.5–33)
MCHC RBC AUTO-ENTMCNC: 32.1 G/DL (ref 31.5–36.5)
MCV RBC AUTO: 89 FL (ref 78–100)
MONOCYTES # BLD AUTO: 0.7 10E3/UL (ref 0–1.3)
MONOCYTES NFR BLD AUTO: 9 %
NEUTROPHILS # BLD AUTO: 4.6 10E3/UL (ref 1.6–8.3)
NEUTROPHILS NFR BLD AUTO: 58 %
NRBC # BLD AUTO: 0 10E3/UL
NRBC BLD AUTO-RTO: 0 /100
PLATELET # BLD AUTO: 371 10E3/UL (ref 150–450)
POTASSIUM SERPL-SCNC: 3.6 MMOL/L (ref 3.4–5.3)
RBC # BLD AUTO: 4.63 10E6/UL (ref 3.8–5.2)
SODIUM SERPL-SCNC: 144 MMOL/L (ref 135–145)
TROPONIN T SERPL HS-MCNC: <6 NG/L
WBC # BLD AUTO: 7.9 10E3/UL (ref 4–11)

## 2024-02-27 PROCEDURE — 93010 ELECTROCARDIOGRAM REPORT: CPT | Performed by: INTERNAL MEDICINE

## 2024-02-27 PROCEDURE — 99285 EMERGENCY DEPT VISIT HI MDM: CPT | Performed by: EMERGENCY MEDICINE

## 2024-02-27 PROCEDURE — 250N000011 HC RX IP 250 OP 636: Performed by: EMERGENCY MEDICINE

## 2024-02-27 PROCEDURE — 70553 MRI BRAIN STEM W/O & W/DYE: CPT

## 2024-02-27 PROCEDURE — 85025 COMPLETE CBC W/AUTO DIFF WBC: CPT | Performed by: EMERGENCY MEDICINE

## 2024-02-27 PROCEDURE — 250N000013 HC RX MED GY IP 250 OP 250 PS 637: Performed by: EMERGENCY MEDICINE

## 2024-02-27 PROCEDURE — 80048 BASIC METABOLIC PNL TOTAL CA: CPT | Performed by: EMERGENCY MEDICINE

## 2024-02-27 PROCEDURE — 84484 ASSAY OF TROPONIN QUANT: CPT | Performed by: EMERGENCY MEDICINE

## 2024-02-27 PROCEDURE — 255N000002 HC RX 255 OP 636: Mod: JZ | Performed by: FAMILY MEDICINE

## 2024-02-27 PROCEDURE — 70450 CT HEAD/BRAIN W/O DYE: CPT

## 2024-02-27 PROCEDURE — 99285 EMERGENCY DEPT VISIT HI MDM: CPT | Mod: 25 | Performed by: EMERGENCY MEDICINE

## 2024-02-27 PROCEDURE — 70496 CT ANGIOGRAPHY HEAD: CPT

## 2024-02-27 PROCEDURE — A9575 INJ GADOTERATE MEGLUMI 0.1ML: HCPCS | Mod: JZ | Performed by: FAMILY MEDICINE

## 2024-02-27 PROCEDURE — 85730 THROMBOPLASTIN TIME PARTIAL: CPT | Performed by: EMERGENCY MEDICINE

## 2024-02-27 PROCEDURE — 82962 GLUCOSE BLOOD TEST: CPT

## 2024-02-27 PROCEDURE — 85610 PROTHROMBIN TIME: CPT | Performed by: EMERGENCY MEDICINE

## 2024-02-27 PROCEDURE — 36415 COLL VENOUS BLD VENIPUNCTURE: CPT | Performed by: EMERGENCY MEDICINE

## 2024-02-27 PROCEDURE — 93005 ELECTROCARDIOGRAM TRACING: CPT | Performed by: EMERGENCY MEDICINE

## 2024-02-27 RX ORDER — GADOTERATE MEGLUMINE 376.9 MG/ML
20 INJECTION INTRAVENOUS ONCE
Status: COMPLETED | OUTPATIENT
Start: 2024-02-27 | End: 2024-02-27

## 2024-02-27 RX ORDER — IOPAMIDOL 755 MG/ML
75 INJECTION, SOLUTION INTRAVASCULAR ONCE
Status: COMPLETED | OUTPATIENT
Start: 2024-02-27 | End: 2024-02-27

## 2024-02-27 RX ORDER — ACETAMINOPHEN 500 MG
1000 TABLET ORAL ONCE
Status: COMPLETED | OUTPATIENT
Start: 2024-02-27 | End: 2024-02-27

## 2024-02-27 RX ORDER — BACLOFEN 10 MG/1
10 TABLET ORAL ONCE
Status: DISCONTINUED | OUTPATIENT
Start: 2024-02-27 | End: 2024-02-27

## 2024-02-27 RX ADMIN — IOPAMIDOL 75 ML: 755 INJECTION, SOLUTION INTRAVENOUS at 15:00

## 2024-02-27 RX ADMIN — GADOTERATE MEGLUMINE 16 ML: 376.9 INJECTION INTRAVENOUS at 21:33

## 2024-02-27 RX ADMIN — ACETAMINOPHEN 1000 MG: 500 TABLET, FILM COATED ORAL at 19:17

## 2024-02-27 ASSESSMENT — ACTIVITIES OF DAILY LIVING (ADL)
ADLS_ACUITY_SCORE: 35

## 2024-02-27 ASSESSMENT — VISUAL ACUITY: OU: NORMAL ACUITY;GLASSES

## 2024-02-27 ASSESSMENT — COLUMBIA-SUICIDE SEVERITY RATING SCALE - C-SSRS
2. HAVE YOU ACTUALLY HAD ANY THOUGHTS OF KILLING YOURSELF IN THE PAST MONTH?: NO
1. IN THE PAST MONTH, HAVE YOU WISHED YOU WERE DEAD OR WISHED YOU COULD GO TO SLEEP AND NOT WAKE UP?: NO
6. HAVE YOU EVER DONE ANYTHING, STARTED TO DO ANYTHING, OR PREPARED TO DO ANYTHING TO END YOUR LIFE?: NO

## 2024-02-27 NOTE — ED PROVIDER NOTES
[facility]  Emergency Department Visit Note    Dizziness      History of Present Illness     HPI:  Pat Tobias is a 58 year old female presenting with dizziness, inability to stand and slurred speech. These symptoms started 2 hours prior to arrival and lasted 7 minutes prior to resolving spontaneously. The patient has not had similar symptoms in the past. At time of emergency department evaluation, she has no numbness, weakness, ataxia, headache, difficulty with word finding or formation, nausea, vomiting. Throughout this recent event, there have been no fever, chills, chest pain, abdominal pain, shortness of breath. There is no recent history of seizure activity or confusion.    Medications:  Prior to Admission medications    Medication Sig Last Dose Taking? Auth Provider Long Term End Date   acetaminophen 167 MG/5ML elixir Take 500 mg by mouth   Reported, Patient     atorvastatin (LIPITOR) 10 MG tablet TAKE 1 TABLET (10MG) BY MOUTH ONCE DAILY WITH SUPPER.   Reported, Patient Yes    B Complex Vitamins (VITAMIN B COMPLEX PO)    Reported, Patient     metoprolol tartrate (LOPRESSOR) 25 MG tablet TAKE 1/2 TABLET (12.5MG) BY MOUTH TWICE A DAY   Reported, Patient Yes    nitroGLYcerin (NITROSTAT) 0.4 MG sublingual tablet Place 0.4 mg under the tongue   Reported, Patient Yes    PARoxetine (PAXIL) 10 MG tablet Take 10 mg by mouth   Reported, Patient Yes    SOYBEAN PO    Reported, Patient         Allergies:  Allergies   Allergen Reactions    Meperidine Hcl Anxiety, Diarrhea, Dizziness and Headache    Other Environmental Allergy Unknown       Problem List:  Patient Active Problem List   Diagnosis    Family history of colon cancer    Lumbar spine pain    Neck pain    Post-nasal drainage    S/P lumbar spinal fusion       Past Medical History:  Past Medical History:   Diagnosis Date    Family history of malignant neoplasm of digestive organ     10/2/2014    Personal history of other medical treatment  "(CODE)     No Comments Provided       Past Surgical History:  Past Surgical History:   Procedure Laterality Date    APPENDECTOMY OPEN      1985    CHOLECYSTECTOMY      1987    COLONOSCOPY  10/07/2014    hyperplastic polyp--repeat 10 years    LAPAROSCOPIC TUBAL LIGATION      No Comments Provided    OTHER SURGICAL HISTORY      06/1997,[id number],SPINAL FUSION,L3/L4       Social History:  Social History     Tobacco Use    Smoking status: Former    Smokeless tobacco: Never   Substance Use Topics    Alcohol use: Yes     Comment: Alcoholic Drinks/day: occasional wine    Drug use: Never       Review of Systems:  Complete review of systems obtained and pertinent positive and negative findings noted in HPI. Review of systems otherwise negative.      Physical Exam     Vital signs: BP (!) 156/95   Pulse 66   Temp 98.1  F (36.7  C) (Tympanic)   Resp 16   Ht 1.727 m (5' 8\")   Wt 76.7 kg (169 lb)   LMP  (LMP Unknown)   SpO2 95%   BMI 25.70 kg/m      Physical Exam:    General: awake and alert, comfortable  HEENT: atraumatic, no scleral injection, no nasal discharge, neck supple  Chest: clear to auscultation bilaterally without wheezes or crackles, non labored respirations, symmetric chest rise  Cardiovascular: regular rate and rhythm, no murmurs or gallops  Abdomen: soft, nontender, no rebound or guarding, nondistended  Extremities: no deformities, edema, or tenderness  Skin: warm, dry, no rashes  Neuro: alert and oriented x 3, 5/5 bilateral hand , bicep, tricep, shoulder abduction, hip flexion, knee extension, plantar/dorsiflexion, sensation intact and symmetric to light touch in bilateral hands and feet, ambulates without difficulty or ataxia, finger to nose normal bilaterally, CN II-XII intact      Medical Decision Making & ED Course     Differential includes cerebrovascular accident, transient ischemic attack, intracranial hemorrhage, intracranial mass, multiple sclerosis, Louis's paralysis, hypoglycemia, atypical " migraine, psychosomatic. Neurologic exam is normal at time of emergency department evaluation and history is most concerning for transient ischemic attack. With this leading differential, patient requires imaging of brain and vessels of the head and neck to evaluate for any ischemia, hemorrhage, mass, arterial stenosis, arterial dissection, arterial occlusion. The CTA imaging is negative and stroke neurology recommends MRI w and w/o contrast.   ED Course as of 02/27/24 1826   Tue Feb 27, 2024   1521 CTA Head Neck with Contrast  Impression:  No large vessel occlusion.     No hemodynamically significant carotid or vertebrobasilar stenosis.     No stenosis, occlusion, or aneurysm of the intracranial arteries.     MELLISA RHODES MD         1521 CT Head w/o Contrast  Impression:  No acute intracranial abnormality.        1825 Awaiting MRI imaging.  Patient will be signed out to oncoming physician for read on MRI and discussion with stroke neurology       I have reviewed the patients ECG, laboratory studies, imaging, and medical records.      Diagnosis & Disposition     Diagnosis:  TIA    Disposition:  Disposition pending MRI results and discussion with stroke neurology    Maria Dolores Hoffmann MD    CONTINUATION OF CARE  12:47 AM--Reviewed stroke neurorecommendations, Plavix load ordered.  Discussed with patient and family they are in agreement with admission.  Discussed with the nocturnist.  We reviewed the consultant notes.  Agreement with admission.  Patient currently hemodynamically stable.    (G45.9) TIA (transient ischemic attack)           Jamil Cheung MD  02/28/24 0047

## 2024-02-27 NOTE — ED NOTES
Family got up date from pt place of work. 3mins to noon. Is when pt started to have slurred and slow speech. Left eye was twitching and pt became wobbly and staff had to catch pt to not fall

## 2024-02-27 NOTE — ED TRIAGE NOTES
"ED Nursing Triage Note (General)   ________________________________    Pat Tobias is a 58 year old Female that presents to triage via private vehicle with her daughter for complaints of dizziness with standing and position change. Daughter states patient experienced slurred/slow speech at work and states coworkers had to hold patient up.  Patient does not have recollection of the event. Patient states staff checked her BG which was 126.  Patient states she had not ate since yesterday at lunch and states staff gave her a sand which.  Patient states when she stands to move she has pressure to bilateral posterior skull and states this is associated with a numb/tingly feeling and states, \"I feel like I'm in a fish bowl\".  Patient states episode occurred around 1130am.  Speech is clear, concise, and spontaneous on arrival.  Patient denies numbness/tingling/weakness in extremities.  Patient has a hx of MI, however, no hx of stroke TIA personally, however, mother and father both have hx of stroke.   Significant symptoms had onset 2 hour(s) ago.    BP (!) 150/92   Pulse 61   Temp 98.1  F (36.7  C) (Tympanic)   Resp 20   Ht 1.727 m (5' 8\")   Wt 76.7 kg (169 lb)   LMP  (LMP Unknown)   SpO2 96%   BMI 25.70 kg/m     PRE HOSPITAL PRIOR LIVING SITUATION Alone         "

## 2024-02-27 NOTE — CONSULTS
"  [facility]    Stroke Telephone Note    I was called by Maria Dolores Hoffmann on 02/27/24 regarding patient Pat Tobias. The patient is a 58 year old female with PMH of CAD, MI, HLD. She presents to the ED for evaluation of transient neurological symptoms. LKW was around 1130 when she had onset of slurred speech and R sided weakness making her unable to ambulate independently. Symptoms lasted approximately 7 minutes and then resolved. Per ED provider no deficits on current exam, NIH=0. /92.     Vitals  BP: (!) 150/92   Pulse: 61   Resp: 20   Temp: 98.1  F (36.7  C)   Weight: 76.7 kg (169 lb)    Imaging Findings  pending    Impression  Dysarthria, R sided weakness, impaired mobility. Concern for small infarct vs TIA.     Recommendations  -complete CT/CTA; if unrevealing recommend brain MRI w/wo  -please contact stroke team for further recommendations once imaging is complete      Case discussed with vascular neurology attending Dr. Ho .    My recommendations are based on the information provided over the phone by Pat DONN Jatinder's in-person providers. They are not intended to replace the clinical judgment of her in-person providers. I was not requested to personally see or examine the patient at this time.     LAMBERTO Moe CNP  Vascular Neurology    To page me or covering stroke neurology team member, click here: [facility]COM  Choose \"On Call\" tab at top, then select \"NEUROLOGY/ALL SITES\" from middle drop-down box, press Enter, then look for \"stroke\" or \"telestroke\" for your site.   "

## 2024-02-28 ENCOUNTER — APPOINTMENT (OUTPATIENT)
Dept: PHYSICAL THERAPY | Facility: OTHER | Age: 59
End: 2024-02-28
Attending: HOSPITALIST
Payer: COMMERCIAL

## 2024-02-28 ENCOUNTER — APPOINTMENT (OUTPATIENT)
Dept: CARDIOLOGY | Facility: OTHER | Age: 59
End: 2024-02-28
Attending: HOSPITALIST
Payer: COMMERCIAL

## 2024-02-28 ENCOUNTER — HOSPITAL ENCOUNTER (OUTPATIENT)
Dept: CARDIOLOGY | Facility: OTHER | Age: 59
Discharge: HOME | End: 2024-02-28
Attending: FAMILY MEDICINE
Payer: COMMERCIAL

## 2024-02-28 VITALS
BODY MASS INDEX: 25.61 KG/M2 | OXYGEN SATURATION: 96 % | HEART RATE: 79 BPM | HEIGHT: 68 IN | WEIGHT: 169 LBS | DIASTOLIC BLOOD PRESSURE: 66 MMHG | TEMPERATURE: 97.3 F | RESPIRATION RATE: 16 BRPM | SYSTOLIC BLOOD PRESSURE: 124 MMHG

## 2024-02-28 DIAGNOSIS — R29.90 EPISODE OF TRANSIENT NEUROLOGIC SYMPTOMS: ICD-10-CM

## 2024-02-28 PROBLEM — G45.9 TIA (TRANSIENT ISCHEMIC ATTACK): Status: ACTIVE | Noted: 2024-02-28

## 2024-02-28 PROBLEM — I25.10 CORONARY ARTERY DISEASE INVOLVING NATIVE CORONARY ARTERY OF NATIVE HEART WITHOUT ANGINA PECTORIS: Status: ACTIVE | Noted: 2024-02-28

## 2024-02-28 PROBLEM — I10 ESSENTIAL HYPERTENSION: Status: ACTIVE | Noted: 2024-02-28

## 2024-02-28 PROBLEM — I25.2 HISTORY OF NON-ST ELEVATION MYOCARDIAL INFARCTION (NSTEMI): Status: ACTIVE | Noted: 2024-02-28

## 2024-02-28 PROBLEM — E78.2 MIXED HYPERLIPIDEMIA: Status: ACTIVE | Noted: 2024-02-28

## 2024-02-28 LAB
ALBUMIN SERPL BCG-MCNC: 4.5 G/DL (ref 3.5–5.2)
ALP SERPL-CCNC: 92 U/L (ref 40–150)
ALT SERPL W P-5'-P-CCNC: 18 U/L (ref 0–50)
ANION GAP SERPL CALCULATED.3IONS-SCNC: 12 MMOL/L (ref 7–15)
AST SERPL W P-5'-P-CCNC: 21 U/L (ref 0–45)
BILIRUB SERPL-MCNC: 1.2 MG/DL
BUN SERPL-MCNC: 11.4 MG/DL (ref 8–23)
CALCIUM SERPL-MCNC: 9.1 MG/DL (ref 8.6–10)
CHLORIDE SERPL-SCNC: 103 MMOL/L (ref 98–107)
CHOLEST SERPL-MCNC: 180 MG/DL
CREAT SERPL-MCNC: 0.53 MG/DL (ref 0.51–0.95)
DEPRECATED HCO3 PLAS-SCNC: 26 MMOL/L (ref 22–29)
EGFRCR SERPLBLD CKD-EPI 2021: >90 ML/MIN/1.73M2
ERYTHROCYTE [DISTWIDTH] IN BLOOD BY AUTOMATED COUNT: 13 % (ref 10–15)
FASTING STATUS PATIENT QL REPORTED: YES
GLUCOSE SERPL-MCNC: 124 MG/DL (ref 70–99)
HBA1C MFR BLD: 6.1 % (ref 4–6.2)
HCT VFR BLD AUTO: 38.7 % (ref 35–47)
HDLC SERPL-MCNC: 53 MG/DL
HGB BLD-MCNC: 12.5 G/DL (ref 11.7–15.7)
HOLD SPECIMEN: NORMAL
LDLC SERPL CALC-MCNC: 77 MG/DL
LVEF ECHO: NORMAL
MCH RBC QN AUTO: 28.5 PG (ref 26.5–33)
MCHC RBC AUTO-ENTMCNC: 32.3 G/DL (ref 31.5–36.5)
MCV RBC AUTO: 88 FL (ref 78–100)
NONHDLC SERPL-MCNC: 127 MG/DL
PLATELET # BLD AUTO: 328 10E3/UL (ref 150–450)
POTASSIUM SERPL-SCNC: 3.7 MMOL/L (ref 3.4–5.3)
PROT SERPL-MCNC: 6.6 G/DL (ref 6.4–8.3)
RBC # BLD AUTO: 4.38 10E6/UL (ref 3.8–5.2)
SODIUM SERPL-SCNC: 141 MMOL/L (ref 135–145)
TRIGL SERPL-MCNC: 250 MG/DL
TSH SERPL DL<=0.005 MIU/L-ACNC: 3.21 UIU/ML (ref 0.3–4.2)
WBC # BLD AUTO: 4.9 10E3/UL (ref 4–11)

## 2024-02-28 PROCEDURE — 999N000157 HC STATISTIC RCP TIME EA 10 MIN

## 2024-02-28 PROCEDURE — 80061 LIPID PANEL: CPT | Performed by: HOSPITALIST

## 2024-02-28 PROCEDURE — 250N000011 HC RX IP 250 OP 636: Performed by: HOSPITALIST

## 2024-02-28 PROCEDURE — 97161 PT EVAL LOW COMPLEX 20 MIN: CPT | Mod: GP

## 2024-02-28 PROCEDURE — 36415 COLL VENOUS BLD VENIPUNCTURE: CPT | Performed by: HOSPITALIST

## 2024-02-28 PROCEDURE — 96375 TX/PRO/DX INJ NEW DRUG ADDON: CPT

## 2024-02-28 PROCEDURE — 93270 REMOTE 30 DAY ECG REV/REPORT: CPT

## 2024-02-28 PROCEDURE — 96372 THER/PROPH/DIAG INJ SC/IM: CPT | Mod: XU | Performed by: HOSPITALIST

## 2024-02-28 PROCEDURE — 93306 TTE W/DOPPLER COMPLETE: CPT | Mod: 26 | Performed by: STUDENT IN AN ORGANIZED HEALTH CARE EDUCATION/TRAINING PROGRAM

## 2024-02-28 PROCEDURE — 250N000013 HC RX MED GY IP 250 OP 250 PS 637: Performed by: FAMILY MEDICINE

## 2024-02-28 PROCEDURE — 80053 COMPREHEN METABOLIC PANEL: CPT | Performed by: HOSPITALIST

## 2024-02-28 PROCEDURE — G0378 HOSPITAL OBSERVATION PER HR: HCPCS

## 2024-02-28 PROCEDURE — 83036 HEMOGLOBIN GLYCOSYLATED A1C: CPT | Performed by: NURSE PRACTITIONER

## 2024-02-28 PROCEDURE — 85027 COMPLETE CBC AUTOMATED: CPT | Performed by: HOSPITALIST

## 2024-02-28 PROCEDURE — G0427 INPT/ED TELECONSULT70: HCPCS | Mod: G0 | Performed by: NURSE PRACTITIONER

## 2024-02-28 PROCEDURE — 250N000013 HC RX MED GY IP 250 OP 250 PS 637: Performed by: HOSPITALIST

## 2024-02-28 PROCEDURE — 99235 HOSP IP/OBS SAME DATE MOD 70: CPT | Mod: 95 | Performed by: HOSPITALIST

## 2024-02-28 PROCEDURE — 96374 THER/PROPH/DIAG INJ IV PUSH: CPT | Mod: XU

## 2024-02-28 PROCEDURE — 250N000011 HC RX IP 250 OP 636: Performed by: FAMILY MEDICINE

## 2024-02-28 PROCEDURE — 255N000002 HC RX 255 OP 636: Performed by: FAMILY MEDICINE

## 2024-02-28 PROCEDURE — 97116 GAIT TRAINING THERAPY: CPT | Mod: GP

## 2024-02-28 PROCEDURE — 84443 ASSAY THYROID STIM HORMONE: CPT | Performed by: HOSPITALIST

## 2024-02-28 PROCEDURE — 99207 PR NO CHARGE LOS: CPT | Performed by: FAMILY MEDICINE

## 2024-02-28 PROCEDURE — 999N000208 ECHOCARDIOGRAM COMPLETE

## 2024-02-28 RX ORDER — MULTIVITAMIN WITH IRON
1 TABLET ORAL DAILY
COMMUNITY

## 2024-02-28 RX ORDER — ASPIRIN 81 MG/1
81 TABLET ORAL DAILY
Status: ON HOLD | COMMUNITY
End: 2024-02-28

## 2024-02-28 RX ORDER — LIDOCAINE 40 MG/G
CREAM TOPICAL
Status: DISCONTINUED | OUTPATIENT
Start: 2024-02-28 | End: 2024-02-28 | Stop reason: HOSPADM

## 2024-02-28 RX ORDER — ASPIRIN 81 MG/1
81 TABLET ORAL DAILY
COMMUNITY
Start: 2024-02-28 | End: 2024-03-05

## 2024-02-28 RX ORDER — ENOXAPARIN SODIUM 100 MG/ML
40 INJECTION SUBCUTANEOUS DAILY
Status: DISCONTINUED | OUTPATIENT
Start: 2024-02-28 | End: 2024-02-28 | Stop reason: HOSPADM

## 2024-02-28 RX ORDER — CALCIUM CARBONATE 500 MG/1
1000 TABLET, CHEWABLE ORAL 4 TIMES DAILY PRN
Status: DISCONTINUED | OUTPATIENT
Start: 2024-02-28 | End: 2024-02-28

## 2024-02-28 RX ORDER — ENOXAPARIN SODIUM 100 MG/ML
40 INJECTION SUBCUTANEOUS DAILY
Status: DISCONTINUED | OUTPATIENT
Start: 2024-02-28 | End: 2024-02-28

## 2024-02-28 RX ORDER — ASPIRIN 325 MG
325 TABLET, DELAYED RELEASE (ENTERIC COATED) ORAL DAILY
Qty: 100 TABLET | Refills: 0 | Status: SHIPPED | OUTPATIENT
Start: 2024-03-19

## 2024-02-28 RX ORDER — AMOXICILLIN 250 MG
2 CAPSULE ORAL 2 TIMES DAILY PRN
Status: DISCONTINUED | OUTPATIENT
Start: 2024-02-28 | End: 2024-02-28 | Stop reason: HOSPADM

## 2024-02-28 RX ORDER — CALCIUM CARBONATE 500 MG/1
1000 TABLET, CHEWABLE ORAL 4 TIMES DAILY PRN
Status: DISCONTINUED | OUTPATIENT
Start: 2024-02-28 | End: 2024-02-28 | Stop reason: HOSPADM

## 2024-02-28 RX ORDER — ONDANSETRON 4 MG/1
4 TABLET, ORALLY DISINTEGRATING ORAL EVERY 6 HOURS PRN
Status: DISCONTINUED | OUTPATIENT
Start: 2024-02-28 | End: 2024-02-28 | Stop reason: HOSPADM

## 2024-02-28 RX ORDER — ACETAMINOPHEN 500 MG
1000 TABLET ORAL EVERY 6 HOURS
COMMUNITY

## 2024-02-28 RX ORDER — ASPIRIN 81 MG/1
81 TABLET, CHEWABLE ORAL DAILY
Status: DISCONTINUED | OUTPATIENT
Start: 2024-02-28 | End: 2024-02-28 | Stop reason: HOSPADM

## 2024-02-28 RX ORDER — ONDANSETRON 2 MG/ML
4 INJECTION INTRAMUSCULAR; INTRAVENOUS EVERY 6 HOURS PRN
Status: DISCONTINUED | OUTPATIENT
Start: 2024-02-28 | End: 2024-02-28 | Stop reason: HOSPADM

## 2024-02-28 RX ORDER — PROCHLORPERAZINE 25 MG
25 SUPPOSITORY, RECTAL RECTAL EVERY 12 HOURS PRN
Status: DISCONTINUED | OUTPATIENT
Start: 2024-02-28 | End: 2024-02-28 | Stop reason: HOSPADM

## 2024-02-28 RX ORDER — CLOPIDOGREL BISULFATE 75 MG/1
300 TABLET ORAL ONCE
Status: COMPLETED | OUTPATIENT
Start: 2024-02-28 | End: 2024-02-28

## 2024-02-28 RX ORDER — PAROXETINE 10 MG/1
10 TABLET, FILM COATED ORAL DAILY
Status: DISCONTINUED | OUTPATIENT
Start: 2024-02-28 | End: 2024-02-28

## 2024-02-28 RX ORDER — KETOROLAC TROMETHAMINE 30 MG/ML
30 INJECTION, SOLUTION INTRAMUSCULAR; INTRAVENOUS ONCE
Status: COMPLETED | OUTPATIENT
Start: 2024-02-28 | End: 2024-02-28

## 2024-02-28 RX ORDER — CLOPIDOGREL BISULFATE 75 MG/1
75 TABLET ORAL DAILY
Status: DISCONTINUED | OUTPATIENT
Start: 2024-02-28 | End: 2024-02-28 | Stop reason: HOSPADM

## 2024-02-28 RX ORDER — ATORVASTATIN CALCIUM 20 MG/1
20 TABLET, FILM COATED ORAL AT BEDTIME
Status: DISCONTINUED | OUTPATIENT
Start: 2024-02-28 | End: 2024-02-28 | Stop reason: HOSPADM

## 2024-02-28 RX ORDER — TURMERIC 400 MG
1 CAPSULE ORAL DAILY
COMMUNITY

## 2024-02-28 RX ORDER — NITROGLYCERIN 0.4 MG/1
0.4 TABLET SUBLINGUAL EVERY 5 MIN PRN
Status: DISCONTINUED | OUTPATIENT
Start: 2024-02-28 | End: 2024-02-28 | Stop reason: HOSPADM

## 2024-02-28 RX ORDER — CLOPIDOGREL BISULFATE 75 MG/1
75 TABLET ORAL DAILY
Qty: 20 TABLET | Refills: 0 | Status: SHIPPED | OUTPATIENT
Start: 2024-02-29

## 2024-02-28 RX ORDER — ACETAMINOPHEN 325 MG/1
650 TABLET ORAL EVERY 4 HOURS PRN
Status: DISCONTINUED | OUTPATIENT
Start: 2024-02-28 | End: 2024-02-28 | Stop reason: HOSPADM

## 2024-02-28 RX ORDER — AMOXICILLIN 250 MG
1 CAPSULE ORAL 2 TIMES DAILY PRN
Status: DISCONTINUED | OUTPATIENT
Start: 2024-02-28 | End: 2024-02-28 | Stop reason: HOSPADM

## 2024-02-28 RX ORDER — DIPHENHYDRAMINE HYDROCHLORIDE 50 MG/ML
50 INJECTION INTRAMUSCULAR; INTRAVENOUS ONCE
Status: COMPLETED | OUTPATIENT
Start: 2024-02-28 | End: 2024-02-28

## 2024-02-28 RX ORDER — PROCHLORPERAZINE MALEATE 10 MG
10 TABLET ORAL EVERY 6 HOURS PRN
Status: DISCONTINUED | OUTPATIENT
Start: 2024-02-28 | End: 2024-02-28 | Stop reason: HOSPADM

## 2024-02-28 RX ORDER — ATORVASTATIN CALCIUM 20 MG/1
20 TABLET, FILM COATED ORAL AT BEDTIME
Qty: 30 TABLET | Refills: 0 | Status: SHIPPED | OUTPATIENT
Start: 2024-02-28 | End: 2024-05-02

## 2024-02-28 RX ORDER — ATORVASTATIN CALCIUM 10 MG/1
10 TABLET, FILM COATED ORAL AT BEDTIME
Status: DISCONTINUED | OUTPATIENT
Start: 2024-02-28 | End: 2024-02-28

## 2024-02-28 RX ADMIN — PERFLUTREN 2 ML: 6.52 INJECTION, SUSPENSION INTRAVENOUS at 11:08

## 2024-02-28 RX ADMIN — KETOROLAC TROMETHAMINE 30 MG: 30 INJECTION, SOLUTION INTRAMUSCULAR; INTRAVENOUS at 13:38

## 2024-02-28 RX ADMIN — CLOPIDOGREL BISULFATE 300 MG: 75 TABLET, FILM COATED ORAL at 01:18

## 2024-02-28 RX ADMIN — METOPROLOL TARTRATE 12.5 MG: 25 TABLET, FILM COATED ORAL at 10:00

## 2024-02-28 RX ADMIN — DIPHENHYDRAMINE HYDROCHLORIDE 50 MG: 50 INJECTION, SOLUTION INTRAMUSCULAR; INTRAVENOUS at 13:38

## 2024-02-28 RX ADMIN — ACETAMINOPHEN 650 MG: 325 TABLET, FILM COATED ORAL at 06:10

## 2024-02-28 RX ADMIN — CLOPIDOGREL BISULFATE 75 MG: 75 TABLET, FILM COATED ORAL at 10:00

## 2024-02-28 RX ADMIN — ASPIRIN 81 MG CHEWABLE TABLET 81 MG: 81 TABLET CHEWABLE at 10:00

## 2024-02-28 RX ADMIN — PROCHLORPERAZINE EDISYLATE 10 MG: 5 INJECTION INTRAMUSCULAR; INTRAVENOUS at 13:38

## 2024-02-28 RX ADMIN — ENOXAPARIN SODIUM 40 MG: 40 INJECTION SUBCUTANEOUS at 10:04

## 2024-02-28 RX ADMIN — PAROXETINE 30 MG: 10 TABLET, FILM COATED ORAL at 09:58

## 2024-02-28 ASSESSMENT — ACTIVITIES OF DAILY LIVING (ADL)
ADLS_ACUITY_SCORE: 31
ADLS_ACUITY_SCORE: 32
ADLS_ACUITY_SCORE: 31
ADLS_ACUITY_SCORE: 35
ADLS_ACUITY_SCORE: 32
ADLS_ACUITY_SCORE: 31
ADLS_ACUITY_SCORE: 32
ADLS_ACUITY_SCORE: 32
ADLS_ACUITY_SCORE: 31
ADLS_ACUITY_SCORE: 31

## 2024-02-28 NOTE — PROGRESS NOTES
SAFETY CHECKLIST  ID Bands and Risk clasps correct and in place (DNR, Fall risk, Allergy, Latex, Limb):  Yes  All Lines Reconciled and labeled correctly: Yes  Whiteboard updated:Yes  Environmental interventions: Yes  Verify Tele #:  [id number]

## 2024-02-28 NOTE — PLAN OF CARE
PRIMARY DIAGNOSIS: TIA  OUTPATIENT/OBSERVATION GOALS TO BE MET BEFORE DISCHARGE:  ADLs back to baseline: Yes    Activity and level of assistance: Up with standby assistance.    Pain status: Improved-controlled with oral pain medications.    Return to near baseline physical activity: Yes     Discharge Planner Nurse   Safe discharge environment identified: Yes  Barriers to discharge: Yes       Entered by: Case Bey RN 02/28/2024 2:28 AM     Please review provider order for any additional goals.   Nurse to notify provider when observation goals have been met and patient is ready for discharge.

## 2024-02-28 NOTE — DISCHARGE SUMMARY
"[facility]  Hospitalist Discharge Summary      Date of Admission:  2/27/2024  Date of Discharge:  2/28/2024  Discharging Provider: Bebe Dukes MD  Discharge Service: Hospitalist Service    Discharge Diagnoses   Principal Problem:    Episode of transient neurologic symptoms - TIA vs migraine variant (2/28/2024)  Active Problems:    Chronic neck and back pain (7/22/2015)      Clinically Significant Risk Factors     # Overweight: Estimated body mass index is 25.7 kg/m  as calculated from the following:    Height as of this encounter: 1.727 m (5' 8\").    Weight as of this encounter: 76.7 kg (169 lb).       Follow-ups Needed After Discharge   Follow-up Appointments     Follow-up and recommended labs and tests       Follow up with primary care provider, Alejandra Helms, within 14 days for   hospital follow- up.  No follow up labs or test are needed.            Unresulted Labs Ordered in the Past 30 Days of this Admission       No orders found for last 31 day(s).        These results will be followed up by PCP and neurology.    Discharge Disposition   Discharged to home  Condition at discharge: Satisfactory    Hospital Course   This 59 yo female with a PMH significant for HTN, hyperlipidemia, CAD S/P MI and chronic neck and back pain presented to the ER with complaints of dizziness, inability to stand and slurred speech which occurred the morning of 2/27/2024 lasting for approximately 7 minutes and resolving spontaneously.  However she has a persisting numb/burning/tingling/strange/lightheaded sensation in her brain.  She was evaluated for stroke and was initially felt to have TIA.  However with no specific focal symptoms and the persisting feeling in her head there is also consideration for a migraine variant.  She was observed overnight, underwent therapy evaluations and was seen by the stroke neurologist who recommends completing her stroke/neuro evaluation as an OP.      Principal Problem:    " Episode of transient neurologic symptoms - TIA vs migraine variant    Assessment: Improving    Plan: No evidence of acute infarct.  No residual deficits.  Still having an unusual sensation in her head so try migraine cocktail (Toradol, Benadryl and Compazine) prior to discharge to see if that will resolve.  Further evaluation with 30 day cardiac monitoring, increase Lipitor dose to get LDL < 70, glucose and BP control adequate.  Follow-up with her PCP in 2 weeks and with stroke neurology in 6-8 weeks.    Recommendations from Neurology Note:  - Neurochecks and Vital Signs every 4 hours   - Continue ASA 81mg + Plavix 75mg x 21 days; then discontinue Plavix and continue ASA monotherapy at 325mg dose daily indefinitely  -Inpatient BP goal <180 with gradual reduction of BP to normotension; long term outpatient goal BP is <130/80 with tighter control associated with improved vascular outcomes   - LDL 77 (goal 40-70); increase Lipitor to 20mg with ongoing outpatient titration to achieve LDL goal, ordered  -Blood glucose monitoring, Hgb A1c 6.1%, (goal <7% for secondary stroke prevention), follow-up with PCP  - TTE (with Bubble Study if age 60 yrs or less), pending   - 24-hour Telemetry  - Bedside Glucose Monitoring  - Rehab (PT/OT/SLP) services NOT required due to lack of ongoing deficit  - Stroke Education  - Euthermia, Euglycemia  -30 day cardiac event monitor upon discharge, if atrial fibrillation not identified on telemetry (ordered)      Patient Follow-up    PCP in 1-2 weeks  Neurology  in 6-8 weeks    Active Problems:    Chronic neck and back pain    Assessment: Stable    Plan: Continue Tylenol as PTA.  Gentle ROM, usual cares.  May be contributing to her HA but this episode is different than her usual HA symptoms.      Essential hypertension    Assessment: Stable    Plan: Continue metoprolol.      Mixed hyperlipidemia    Assessment: Stable    Plan: Not quite controlled with LDL 77 with target less than 70.  Increase  Lipitor to 20 mg daily and follow-up with PCP for monitoring.      Coronary artery disease involving native coronary artery of native heart without angina pectoris    Assessment: Stable    Plan: Continue usual medications.  No new symptoms.      History of non-ST elevation myocardial infarction (NSTEMI)    Assessment: Stable    Plan: Continue previous medications.  Will be on Plavix for 21 days total with ASA 81 mg daily.  Then increase to  mg daily unless instructed otherwise by stroke neurology.    Consultations This Hospital Stay   NEUROLOGY IP STROKE CONSULT  PHYSICAL THERAPY ADULT IP CONSULT  OCCUPATIONAL THERAPY ADULT IP CONSULT  SPEECH LANGUAGE PATH ADULT IP CONSULT  NEUROLOGY IP STROKE CONSULT    Code Status   Full Code    Time Spent on this Encounter   I, Bebe Dukes MD, personally saw the patient today and spent greater than 30 minutes discharging this patient.       Bebe Dukes MD  [facility]  [address]  [place] [address]  Phone: [phone]  Fax: [phone]  ______________________________________________________________________    Physical Exam   Vital Signs: Temp: 97.3  F (36.3  C) Temp src: Tympanic BP: 124/66 Pulse: 79   Resp: 16 SpO2: 96 % O2 Device: None (Room air)    Weight: 169 lbs 0 oz  Constitutional: awake, alert, cooperative, no apparent distress, appears stated age, and mildly obese  Eyes: pupils equal, round and reactive to light, extra-ocular muscles intact, and conjunctiva normal  ENT: normocephalic, without obvious abnormality, atraumatic  Respiratory: no increased work of breathing, decreased air exchange throughout but clear to auscultation  Cardiovascular: regular rate and rhythm, normal S1 and S2, no murmur noted, and no edema  GI: normal bowel sounds, soft, non-distended, and non-tender  Skin: normal skin color, texture, turgor and no redness, warmth, or swelling  Musculoskeletal: no lower extremity pitting edema present  there  is no redness, warmth, or swelling of the joints  motor strength is 5 out of 5 all extremities bilaterally  Neurologic: Mental Status Exam:  Level of Alertness:   awake  Orientation:   person, place, time  Memory:   normal  Fund of Knowledge:  normal  Attention/Concentration:  normal  Language:  normal  Cranial Nerves:  cranial nerves II-XII are grossly intact  Motor Exam:  Motor exam is symmetrical 5 out of 5 all extremities bilaterally       Primary Care Physician   Alejandra Helms    Discharge Orders      Reason for your hospital stay    Neurological symptoms - possible migraine     Follow-up and recommended labs and tests     Follow up with primary care provider, Alejandra Helms, within 14 days for hospital follow- up.  No follow up labs or test are needed.     Activity    Your activity upon discharge: activity as tolerated     Full Code     Diet    Follow this diet upon discharge: Orders Placed This Encounter      Low Saturated Fat Na <2400 mg     Stroke Hospital Follow Up    Please be aware that coverage of these services is subject to the terms and limitations of your health insurance plan.  Call member services at your health plan with any benefit or coverage questions.  [employer] will call you to coordinate care as prescribed by your provider. If you don t hear from a representative within 2 business days, please call [phone].         Significant Results and Procedures   Most Recent 3 CBC's:  Recent Labs   Lab Test 02/28/24  0547 02/27/24  1354 04/03/17  1430   WBC 4.9 7.9  --    HGB 12.5 13.3 14.6   MCV 88 89 87    371 380     Most Recent 3 BMP's:  Recent Labs   Lab Test 02/28/24  0547 02/27/24  1354 02/27/24  1347 04/03/17  1436    144  --  138   POTASSIUM 3.7 3.6  --  3.9   CHLORIDE 103 104  --  105   CO2 26 28  --  26   BUN 11.4 13.3  --  13   CR 0.53 0.60  --  0.59*   ANIONGAP 12 12  --   --    AIDA 9.1 9.4  --  9.7   * 113* 119* 83     Most Recent 3 Troponin's:No lab results  found.  Most Recent Cholesterol Panel:  Recent Labs   Lab Test 02/28/24  0547   CHOL 180   LDL 77   HDL 53   TRIG 250*     7-Day Micro Results       No results found for the last 168 hours.          Most Recent TSH and T4:  Recent Labs   Lab Test 02/28/24  0547   TSH 3.21     Most Recent Hemoglobin A1c:  Recent Labs   Lab Test 02/28/24  0547   A1C 6.1   ,   Results for orders placed or performed during the hospital encounter of 02/27/24   CT Head w/o Contrast    Narrative    Exam: CT HEAD W/O CONTRAST      Exam reason: Acute neuro deficit, stroke/TIA suspected    Technique:   Axial images of the head obtained without contrast. Coronal and  sagittal reformations were generated. This CT was performed using one  or more of the following dose reduction techniques: automated exposure  control, adjustment of the mA and/or kV according to patient size,  and/or use of iterative reconstruction technique.    Comparison: None.        Findings:      Parenchyma: No evidence of intraparenchymal hemorrhage, mass, acute  cortical infarct or prior infarct in a major vascular territory.      No midline shift. The basilar cisterns are patent.    Extra-axial spaces: No extra-axial fluid collection or hemorrhage.     Ventricles: Normal.  Paranasal sinuses: Clear.   Mastoid air cells: Clear.    Osseous: No acute findings.  Orbits: Normal.    Soft tissues: Unremarkable.       Impression    Impression:  No acute intracranial abnormality.          MELLISA RHODES MD         SYSTEM ID:  [id number]   CTA Head Neck with Contrast    Narrative    Exam: CTA HEAD NECK W CONTRAST    Exam reason: Acute neuro deficit, stroke/TIA suspected    Technique:     Using helical CT technique, and IV contrast, thin section arterial  phase axial images of the neck and head ([place], COW) were  performed, down to the level of the aortic arch.  Post-processing 2D  reformatted images of the carotid arteries and cerebral arteries were  performed, including  coronal and sagittal reconstructions. MIP  reconstructions of the carotid arteries and cerebral arteries also  performed. This CT was performed using one or more of the following  dose reduction techniques: automated exposure control, adjustment of  the mA and/or kV according to patient size, and/or use of iterative  reconstruction technique.    Meds/Contrast: 75 ml isovue 370    Comparison: None.    Findings:    CTA of the neck:     Aortic arch: No significant abnormality of the aortic arch and the  origins of its branch vessels.       Right common carotid artery: No significant stenosis.     Right internal carotid artery: No hemodynamically significant  stenosis.  No significant plaque.    Left common carotid artery: No significant stenosis.     Left internal carotid artery: No hemodynamically significant stenosis.   No significant plaque.    Vertebral arteries: No hemodynamically significant stenosis.     * Note: Measurements of stenoses were done in accordance with NASCET  criteria. That is, the stenosis is calculated from the ratio of the  linear luminal diameter of the narrowest segment of the diseased  portion of the artery compared to the diameter of the artery beyond or  distal to any post stenotic dilatation.     CTA of the head ([place], COW):     General: No occlusion, thrombosis, hemodynamically significant  stenosis or aneurysm about the [place].     Anterior communicating artery: There is azygous anterior cerebral  artery.  Posterior communicating arteries: Congenitally absent or hypoplastic.    Additional Findings:    Intracranial contents: No evidence of acute cortical infarct, infarct  in a major vascular territory, mass or enhancing abnormality.     Soft tissues of the neck: No acute findings.    Visualized lung apices: Clear.    Osseous: No acute osseous anomalies.      Impression    Impression:  No large vessel occlusion.    No hemodynamically significant carotid or  vertebrobasilar stenosis.    No stenosis, occlusion, or aneurysm of the intracranial arteries.    MELLISA RHODES MD         SYSTEM ID:  [id number]   MR Brain w/o & w Contrast    Narrative    EXAM:  MR BRAIN W/O & W CONTRAST    HISTORY:  TIA.    TECHNIQUE:  Multiplanar, multisequence MR imaging of the head obtained  prior to, and after, intravenous contrast administration    MEDS/CONTRAST: Dotarem 16 mL    COMPARISON:  CT head and CTA head and neck 2024     FINDINGS:    There is no mass effect, midline shift, or evidence of acute  intracranial hemorrhage. Diffusion weighted images demonstrate no  evidence of acute infarction. The ventricles are proportionate to the  cerebral sulci. A few scattered foci of T2 hyperintense FLAIR signal  in the periventricular and subcortical white matter, which is  nonspecific. Normal major intracranial vascular flow voids.    Postcontrast images demonstrate no abnormal intraparenchymal  enhancement. Dural-based enhancing focus over the lateral right  frontal convexity measuring 10 x 9 x 4 mm, consistent with benign  meningioma.    No suspicious abnormality of the skull marrow signal. No paranasal  sinus mucosal thickening. Diminutive right maxillary sinus, possibly  sequela of chronic sinusitis. Mastoid air cells are clear. The orbits  are grossly unremarkable.      Impression    IMPRESSION:  1. No acute intracranial abnormality.  2. 10 mm meningioma over the right frontal convexity.    GARRETT MAZARIEGOS MD         SYSTEM ID:  [id number]   Echocardiogram Complete     Value    LVEF  55-60%    Narrative    [id number]  [id number]  [id number]  [id number]^SIOBHAN SOTO^AMRITA^CECILIA     [facility]  [address] [place] [address]     Name: CAMILLA ROLLINS  MRN: [id number]  : 1965  Study Date: 2024 08:16 AM  Age: 58 yrs  Gender: Female  Patient Location: [facility]  Reason For Study: TIA  Ordering Physician: AMRITA DE LEON  BROOKS  Performed By: Kristi Hale, NINO, RDCS, RVT     BSA: 1.9 m2  Height: 68 in  Weight: 169 lb  HR: 78  BP: 140/70 mmHg  ______________________________________________________________________________  Procedure  Complete Portable Echo Adult. Contrast Definity. Definity (NDC #29666-102-43)  given intravenously. Patient was given 3ml mixture of 1.5ml Definity and 8.5ml  saline. 7 ml wasted. Definity Lot # [id number] .  ______________________________________________________________________________  Interpretation Summary  No intracardiac source of emboli identified  Global and regional left ventricular function is normal with an EF of 55-60%.  Global right ventricular function is normal.  No significant valvular abnormalities present.  IVC diameter <2.1 cm collapsing >50% with sniff suggests a normal RA pressure  of 3 mmHg.  No pericardial effusion is present.  There is no prior study for direct comparison.  ______________________________________________________________________________  Left Ventricle  Left ventricular wall thickness is normal. Left ventricular size is normal.  Global and regional left ventricular function is normal with an EF of 55-60%.  Left ventricular diastolic function is normal. No regional wall motion  abnormalities are seen.     Right Ventricle  The right ventricle is normal size. Global right ventricular function is  normal.     Atria  Both atria appear normal.     Mitral Valve  Trace mitral insufficiency is present.     Aortic Valve  The aortic valve is tricuspid.     Tricuspid Valve  The valve leaflets are not well visualized. Trace tricuspid insufficiency is  present. The peak velocity of the tricuspid regurgitant jet is not obtainable.  Pulmonary artery systolic pressure cannot be assessed.     Pulmonic Valve  The valve leaflets are not well visualized. Trace pulmonic insufficiency is  present.     Vessels  The thoracic aorta is normal. The aorta root is normal. IVC diameter <2.1  cm  collapsing >50% with sniff suggests a normal RA pressure of 3 mmHg.     Pericardium  No pericardial effusion is present.     Miscellaneous  No significant valvular abnormalities present.     Compared to Previous Study  There is no prior study for direct comparison.  ______________________________________________________________________________  MMode/2D Measurements & Calculations     IVSd: 0.91 cm  LVIDd: 3.9 cm  LVIDs: 2.7 cm  LVPWd: 1.1 cm  FS: 29.3 %  LV mass(C)d: 117.2 grams  LV mass(C)dI: 61.6 grams/m2  Ao root diam: 2.7 cm  asc Aorta Diam: 3.2 cm  LVOT diam: 2.1 cm  LVOT area: 3.5 cm2  Ao root diam index Ht(cm/m): 1.6  Ao root diam index BSA (cm/m2): 1.4  Asc Ao diam index BSA (cm/m2): 1.7  Asc Ao diam index Ht(cm/m): 1.9  LA Volume (BP): 44.2 ml     LA Volume Index (BP): 23.3 ml/m2  RWT: 0.55  TAPSE: 2.1 cm     Doppler Measurements & Calculations  MV E max barak: 48.7 cm/sec  MV A max barak: 81.0 cm/sec  MV E/A: 0.60  MV dec time: 0.25 sec  Ao V2 max: 138.0 cm/sec  Ao max P.0 mmHg  Ao V2 mean: 96.4 cm/sec  Ao mean P.0 mmHg  Ao V2 VTI: 26.4 cm  JENNA(I,D): 2.7 cm2  JENNA(V,D): 2.7 cm2  LV V1 max P.6 mmHg  LV V1 max: 107.0 cm/sec  LV V1 VTI: 20.3 cm  SV(LVOT): 71.6 ml  SI(LVOT): 37.6 ml/m2  PA acc time: 0.10 sec  AV Barak Ratio (DI): 0.78  JENNA Index (cm2/m2): 1.4  E/E' av.2     Lateral E/e': 4.4  Medial E/e': 6.0  RV S Barak: 15.4 cm/sec     ______________________________________________________________________________  Report approved by: Silvia Rangel 2024 12:45 PM             Discharge Medications   Current Discharge Medication List        START taking these medications    Details   clopidogrel (PLAVIX) 75 MG tablet Take 1 tablet (75 mg) by mouth daily  Qty: 20 tablet, Refills: 0    Associated Diagnoses: TIA (transient ischemic attack)           CONTINUE these medications which have CHANGED    Details   !! aspirin (ASA) 325 MG EC tablet Take 1 tablet (325 mg) by mouth  daily  Qty: 100 tablet, Refills: 0    Associated Diagnoses: TIA (transient ischemic attack); Lumbar spine pain; Neck pain; S/P lumbar spinal fusion      !! aspirin 81 MG EC tablet Take 1 tablet (81 mg) by mouth daily      atorvastatin (LIPITOR) 20 MG tablet Take 1 tablet (20 mg) by mouth at bedtime  Qty: 30 tablet, Refills: 0    Associated Diagnoses: TIA (transient ischemic attack)       !! - Potential duplicate medications found. Please discuss with provider.        CONTINUE these medications which have NOT CHANGED    Details   acetaminophen (TYLENOL) 500 MG tablet Take 1,000 mg by mouth every 6 hours      B Complex Vitamins (VITAMIN B COMPLEX PO)       magnesium 250 MG tablet Take 1 tablet by mouth daily      nitroGLYcerin (NITROSTAT) 0.4 MG sublingual tablet Place 0.4 mg under the tongue every 5 minutes as needed for chest pain      PARoxetine (PAXIL) 10 MG tablet Take 30 mg by mouth every morning      potassium 99 MG TABS Take 1 tablet by mouth daily      Turmeric 400 MG CAPS Take 1 capsule by mouth daily      metoprolol tartrate (LOPRESSOR) 25 MG tablet TAKE 1/2 TABLET (12.5MG) BY MOUTH TWICE A DAY           Allergies   Allergies   Allergen Reactions    Meperidine Hcl Anxiety, Diarrhea, Dizziness and Headache    Other Environmental Allergy Unknown

## 2024-02-28 NOTE — PROVIDER NOTIFICATION
02/28/24 0223   Valuables   Patient Belongings remains with patient   Patient Belongings Remaining with Patient cash/credit card;cell phone/electronics;clothing;purse/wallet;vision aids   Did you bring any home meds/supplements to the hospital?  No     A               Admission:  I am responsible for any personal items that are not sent to the safe or pharmacy.  [facility] is not responsible for loss, theft or damage of any property in my possession.    Signature:  _________________________________ Date: _______  Time: _____                                              Staff Signature:  ____________________________ Date: ________  Time: _____      2nd Staff person, if patient is unable/unwilling to sign:    Signature: ________________________________ Date: ________  Time: _____     Discharge:  [facility] has returned all of my personal belongings:    Signature: _________________________________ Date: ________  Time: _____                                          Staff Signature:  ____________________________ Date: ________  Time: _____

## 2024-02-28 NOTE — PROGRESS NOTES
[facility] DISCHARGE NOTE    Patient discharged to home at 3:35 PM via ambulation. Accompanied by spouse and staff. Discharge instructions reviewed with patient, opportunity offered to ask questions. Prescriptions sent with patient to fill . All belongings sent with patient.  All questions asked and answered. Arabella Quezada RN on 2/28/2024 at 3:36 PM  Arabella Quezada RN

## 2024-02-28 NOTE — PLAN OF CARE
PRIMARY DIAGNOSIS: TIA  OUTPATIENT/OBSERVATION GOALS TO BE MET BEFORE DISCHARGE:  ADLs back to baseline: Yes    Activity and level of assistance: Up with standby assistance.    Pain status: Improved-controlled with oral pain medications.    Return to near baseline physical activity: Yes     Discharge Planner Nurse   Safe discharge environment identified: Yes  Barriers to discharge: Yes       Entered by: Case Bey RN 02/28/2024 7:23 AM     Please review provider order for any additional goals.   Nurse to notify provider when observation goals have been met and patient is ready for discharge.

## 2024-02-28 NOTE — PHARMACY-ADMISSION MEDICATION HISTORY
Pharmacist Admission Medication History    Admission medication history is complete. The information provided in this note is only as accurate as the sources available at the time of the update.    Information Source(s): Patient and CareEverywhere/SureScripts via in-person    Pertinent Information: Patient knowledgeable on medications- takes OTC K, as well as other supplements.    Changes made to PTA medication list:  Added: ASA 81 mg, K, mag, turmeric  Deleted: Soybean  Changed: Paxil to 30 mg, APAP to tablets and sched    Allergies reviewed with patient and updates made in EHR: yes    Medication History Completed By: Sabiha Farias RPH 2/28/2024 1:17 PM

## 2024-02-28 NOTE — PLAN OF CARE
"Goal Outcome Evaluation:  Patient is alert and oriented x4. Vss. /82   Pulse 94   Temp 97.6  F (36.4  C) (Tympanic)   Resp 16   Ht 1.727 m (5' 8\")   Wt 76.7 kg (169 lb)   LMP  (LMP Unknown)   SpO2 97%   BMI 25.70 kg/m   No deficits noted with neuro checks. Patient is meeting with telestroke at this time. Patient reports a throbbing headache to the back of her head that is relieved with tylenol.                       "

## 2024-02-28 NOTE — CONSULTS
"      [facility]    Stroke Consult Note    Reason for Consult:  dizziness/weakness    Chief Complaint: Dizziness       HPI  Pat Tobias is a 58 year old female with PMH of HTN, HLD, CAD, MI. She presented to the ED 2/27/24 for evaluation of transient neurological symptoms. LKW was around 1130 when she had onset of slurred speech and R sided weakness making her unable to ambulate independently. Symptoms lasted approximately 7 minutes and then resolved. Per ED provider no deficits on initial exam, NIH=0. /92.  Given concern for TIA she was loaded with DPAT and admitted for further evaluation.    Today, Carla reports that she is feeling back to baseline.  She notes that symptoms started over the weekend when she had a mild \"pressure feeling\" in the biparietal/bioccipital region.  This was not severe and she does not recall other associated symptoms.  She notes that she went to bed very early on Monday night feeling tired and awoke the next morning feeling much more exhausted than she normally would.  She went to work and was having a routine day aside from feeling a bit tired and \"off.\"  Late in the morning while she was at a meeting she had recurrence of the pressure in her head which she describes as \"like Novocain inside of my skull.\"  Her colleagues then reported to her that she appeared very off balance like she could fall and that she was having difficulty getting the correct words out.  Interestingly, Carla does not recall the details very well.  She is not aware of herself or anyone around her reporting more focal symptoms such as evidence of one-sided weakness, unilateral numbness, ataxia etc.    In regards to her medical history she notes that she has previously suffered heart attack.  She also experienced significant spinal trauma in her past and reports that as a result she has been having daily neck pain and headaches.  She has been treating these headaches with daily " analgesics; initially daily ibuprofen until her heart attack after which she transitioned to Tylenol 1000 mg 4 times per day.  She does note that she was previously diagnosed with migraine (approximately 20 years ago).  She has never been on migraine specific abortive therapies or any headache prophylactic therapies to her knowledge.      Stroke Evaluation Summarized    MRI/Head CT MRI: no acute infarct or hemorrhage; mild chronic small vessel disease, 10mm meningioma over R frontal convexity    Intracranial Vasculature CTA: No LVO or significant stenosis   Cervical Vasculature CTA: No LVO, significant stenosis or dissection      Echocardiogram pending   EKG/Telemetry pending   Other Testing Not Applicable      LDL  2/28/2024: 77 mg/dL   A1C  2/28/2024: 6.1 %    Troponin 2/27/2024: <6 ng/L       Impression  Episode of head pressure, instability and speech changes, now resolved. Concern for possible TIA vs vestibular migraine/migraine variant vs less likely seizure vs other    I reviewed with Carla that I favored her symptoms likely being from a nonvascular etiology such as a migraine variant.  However, in light of her numerous vascular risk factors she is agreeable to completing standard TIA workup and treatment.  I do strongly encourage ongoing outpatient neurology follow-up for further evaluation/management of chronic headache disorder and ongoing surveillance/workup/management of subsequent transient neurological symptoms    Chronic daily headaches, medication overuse headache; question underlying migraine syndrome     R frontal meningioma, incidental/asymptomatic     Recommendations   - Neurochecks and Vital Signs every 4 hours   - Continue ASA 81mg + Plavix 75mg x 21 days; then discontinue Plavix and continue ASA monotherapy at 325mg dose daily indefinitely  -Inpatient BP goal <180 with gradual reduction of BP to normotension; long term outpatient goal BP is <130/80 with tighter control associated with  "improved vascular outcomes   - LDL 77 (goal 40-70); increase Lipitor to 20mg with ongoing outpatient titration to achieve LDL goal, ordered  -Blood glucose monitoring, Hgb A1c 6.1%, (goal <7% for secondary stroke prevention), follow-up with PCP  - TTE (with Bubble Study if age 60 yrs or less), pending   - 24-hour Telemetry  - Bedside Glucose Monitoring  - Rehab (PT/OT/SLP) services NOT required due to lack of ongoing deficit  - Stroke Education  - Euthermia, Euglycemia  -30 day cardiac event monitor upon discharge, if atrial fibrillation not identified on telemetry (ordered)     Patient Follow-up    PCP in 1-2 weeks  Neurology  in 6-8 weeks    Thank you for this consult. We will follow peripherally for results of the TTE and if no concerns then will sign off. Please contact us with any additional questions.    LAMBERTO Moe CNP  Vascular Neurology    To page me or covering stroke neurology team member, click here: [facility]  Choose \"On Call\" tab at top, then select \"NEUROLOGY/ALL SITES\" from middle drop-down box, press Enter, then look for \"stroke\" or \"telestroke\" for your site.  _____________________________________________________    Clinically Significant Risk Factors Present on Admission                       # Overweight: Estimated body mass index is 25.7 kg/m  as calculated from the following:    Height as of this encounter: 1.727 m (5' 8\").    Weight as of this encounter: 76.7 kg (169 lb).                 Past Medical History    Past Medical History:   Diagnosis Date    Family history of malignant neoplasm of digestive organ     10/2/2014    Personal history of other medical treatment (CODE)     No Comments Provided     Medications   Home Meds  Prior to Admission medications    Medication Sig Start Date End Date Taking? Authorizing Provider   acetaminophen 167 MG/5ML elixir Take 500 mg by mouth    Reported, Patient   atorvastatin (LIPITOR) 10 MG tablet TAKE 1 TABLET (10MG) BY MOUTH ONCE DAILY WITH " SUPPER. 5/30/19   Reported, Patient   B Complex Vitamins (VITAMIN B COMPLEX PO)  4/3/17   Reported, Patient   metoprolol tartrate (LOPRESSOR) 25 MG tablet TAKE 1/2 TABLET (12.5MG) BY MOUTH TWICE A DAY 5/30/19   Reported, Patient   nitroGLYcerin (NITROSTAT) 0.4 MG sublingual tablet Place 0.4 mg under the tongue 8/22/18   Reported, Patient   PARoxetine (PAXIL) 10 MG tablet Take 10 mg by mouth 10/2/19   Reported, Patient   SOYBEAN PO  4/3/17   Reported, Patient       Scheduled Meds   aspirin  81 mg Oral Daily    atorvastatin  20 mg Oral At Bedtime    clopidogrel  75 mg Oral Daily    enoxaparin ANTICOAGULANT  40 mg Subcutaneous Daily    metoprolol tartrate  12.5 mg Oral BID    PARoxetine  30 mg Oral Daily    sodium chloride (PF)  3 mL Intracatheter Q8H       Infusion Meds      Allergies   Allergies   Allergen Reactions    Meperidine Hcl Anxiety, Diarrhea, Dizziness and Headache    Other Environmental Allergy Unknown          PHYSICAL EXAMINATION   Temp:  [97.3  F (36.3  C)-98.1  F (36.7  C)] 97.3  F (36.3  C)  Pulse:  [48-94] 94  Resp:  [0-60] 16  BP: (110-158)/() 138/82  SpO2:  [88 %-99 %] 97 %    General Exam  General:  patient sitting in chair without any acute distress    HEENT:  normocephalic/atraumatic  Pulmonary:  no respiratory distress      Neuro Exam  Mental Status:  alert, oriented x 3, follows commands, speech clear and fluent, naming and repetition normal  Cranial Nerves:  visual fields intact (tested by nurse), EOMI with normal smooth pursuit, facial sensation intact and symmetric (tested by nurse), facial movements symmetric, hearing not formally tested but intact to conversation, no dysarthria, shoulder shrug equal bilaterally, tongue protrusion midline  Motor:  no abnormal movements, able to move all limbs antigravity spontaneously with no signs of hemiparesis observed, no pronator drift   Reflexes:  unable to test (telestroke)  Sensory:  light touch sensation intact and symmetric throughout upper  and lower extremities (assessed by nurse), no extinction on double simultaneous stimulation (assessed by nurse)  Coordination:  normal finger-to-nose and heel-to-shin bilaterally without dysmetria, rapid alternating movements symmetric  Station/Gait:  unable to test due to telestroke    Stroke Scales    NIHSS  1a. Level of Consciousness 0-->Alert, keenly responsive   1b. LOC Questions 0-->Answers both questions correctly   1c. LOC Commands 0-->Performs both tasks correctly   2.   Best Gaze 0-->Normal   3.   Visual 0-->No visual loss   4.   Facial Palsy 0-->Normal symmetrical movements   5a. Motor Arm, Left 0-->No drift, limb holds 90 (or 45) degrees for full 10 secs   5b. Motor Arm, Right 0-->No drift, limb holds 90 (or 45) degrees for full 10 secs   6a. Motor Leg, Left 0-->No drift, leg holds 30 degree position for full 5 secs   6b. Motor Leg, right 0-->No drift, leg holds 30 degree position for full 5 secs   7.   Limb Ataxia 0-->Absent   8.   Sensory 0-->Normal, no sensory loss   9.   Best Language 0-->No aphasia, normal   10. Dysarthria 0-->Normal   11. Extinction and Inattention  0-->No abnormality   Total 0 (02/28/24 0959)       Imaging  I personally reviewed all imaging; relevant findings per HPI.    Labs Data   CBC  Recent Labs   Lab 02/28/24  0547 02/27/24  1354   WBC 4.9 7.9   RBC 4.38 4.63   HGB 12.5 13.3   HCT 38.7 41.4    371     Basic Metabolic Panel   Recent Labs   Lab 02/28/24  0547 02/27/24  1354 02/27/24  1347    144  --    POTASSIUM 3.7 3.6  --    CHLORIDE 103 104  --    CO2 26 28  --    BUN 11.4 13.3  --    CR 0.53 0.60  --    * 113* 119*   AIDA 9.1 9.4  --      Liver Panel  Recent Labs   Lab 02/28/24  0547   PROTTOTAL 6.6   ALBUMIN 4.5   BILITOTAL 1.2   ALKPHOS 92   AST 21   ALT 18     INR    Recent Labs   Lab Test 02/27/24  1354   INR 0.98           Stroke Consult Data Data   Telestroke Service Details  (for non-emergent stroke consult with tele)  Video start time 02/28/24    1000   Video end time 02/28/24   1034   Type of service telemedicine diagnostic assessment of acute neurological changes   Reason telemedicine is appropriate patient requires assessment with a specialist for diagnosis and treatment of neurological symptoms   Mode of transmission secure interactive audio and video communication per Jorje   Originating site (patient location) [facility]    Distant site (provider location) [facility]       I have personally spent a total of 65 minutes providing care today, time spent in reviewing medical records and devising the plan as recorded above.

## 2024-02-28 NOTE — PLAN OF CARE
Patient is alert and orientated x4. VSS. LS clear. Neuro's q2h, unchanged. PRN given for chronic pain rated at 5/10. Tele #2. SBA in room.     Goal Outcome Evaluation:      Plan of Care Reviewed With: patient    Overall Patient Progress: improvingOverall Patient Progress: improving

## 2024-02-28 NOTE — PROGRESS NOTES
02/28/24 1006   Appointment Info   Signing Clinician's Name / Credentials (PT) Troy Hardy MPT   Living Environment   People in Home spouse   Current Living Arrangements house   Home Accessibility no concerns   Transportation Anticipated family or friend will provide   Self-Care   Usual Activity Tolerance good   Current Activity Tolerance moderate   Equipment Currently Used at Home none   Fall history within last six months no   General Information   Referring Physician Ernst   Patient/Family Therapy Goals Statement (PT) return home   Existing Precautions/Restrictions fall   Weight-Bearing Status - LLE full weight-bearing   Weight-Bearing Status - RLE full weight-bearing   Cognition   Affect/Mental Status (Cognition) WFL   Orientation Status (Cognition) oriented x 4   Follows Commands (Cognition) WFL   Pain Assessment   Patient Currently in Pain Yes, see Vital Sign flowsheet  (headache)   Integumentary/Edema   Integumentary/Edema no deficits were identifed   Posture    Posture Not impaired   Range of Motion (ROM)   Range of Motion ROM is WFL   Strength (Manual Muscle Testing)   Strength (Manual Muscle Testing) strength is WFL   Strength Comments no right vs left strength deficits in LEs   Bed Mobility   Comment, (Bed Mobility) SBA   Transfers   Comment, (Transfers) SBA without use of AD   Gait/Stairs (Locomotion)   [place] Level (Gait) supervision   Assistive Device (Gait) other (see comments)  (no AD)   Distance in Feet (Gait) 225   Pattern (Gait) swing-through   Balance   Balance no deficits were identified   Balance Comments negative Romberg; retro gait   Sensory Examination   Sensory Perception WFL   Sensory Perception Comments patient report of mild headache which has been persistent since her initial admission   Coordination   Coordination no deficits were identified   Coordination Comments negative heel to shin bilaterally   Muscle Tone   Muscle Tone no deficits were identified   Clinical  Impression   Criteria for Skilled Therapeutic Intervention Evaluation only   PT Diagnosis (PT) impaired mobility   Influenced by the following impairments fatigue/headache   Functional limitations due to impairments activity/gait tolerance   Clinical Presentation (PT Evaluation Complexity) stable   Clinical Decision Making (Complexity) low complexity   Planned Therapy Interventions (PT) gait training   Risk & Benefits of therapy have been explained evaluation/treatment results reviewed;risks/benefits reviewed;patient   PT Total Evaluation Time   PT Eval, Low Complexity Minutes (66255) 15   Physical Therapy Goals   PT Frequency One time eval and treatment only   PT Discharge Planning   PT Plan Patient discharging   PT Discharge Recommendation (DC Rec) home with assist   PT Rationale for DC Rec patient demonstrating near/at baseline mobility; she will not require any additional PT at time of discharge from this hospital admission   PT Brief overview of current status bed, transfer and gait mobilities all with SBA   PT Equipment Needed at Discharge   (no assistive gait devices necessary)   Total Session Time   Total Session Time (sum of timed and untimed services) 15

## 2024-02-28 NOTE — PATIENT INSTRUCTIONS
Date Placed on Pt:  02/28/2024    Respiratory Therapist reviewed [facility] placement process and asked patient if they are comfortable proceeding with placement.    Patient (is or is not) is comfortable proceeding.     Patient (would or would not) would not like an employee of same gender to be (present or to place) present or to place the [employer]OT.     Patient was given MCOT device and instructed on:    -Keeping monitor and sensor within 30 feet of each other at all times  -How to record a sympton manually  -To remove patch at 5-7 days to charge sensor and reapply patch.  -How to remove sensor from original patch and to place in new patch.  -How to charge sensor and monitor  -How to move through the screens on the monitor  -When to trigger a froilan event  -How to return devices (FedEx/UPS)  -How long to wear device  -Who to call with questions  -Not to swim or take a bath with device on  -Patient instructed to wear for 30 days    Documentation of MCOT placement site (Bleeding or Not Bleeding) Not Bleeding.     If there is bleeding documentation of why.  N/A           Documentation of accommodations made for patient.  No

## 2024-02-28 NOTE — PHARMACY - DISCHARGE MEDICATION RECONCILIATION AND EDUCATION
Pharmacy:  Discharge Counseling and Medication Reconciliation    Pat Ramires  [address]  [place] [id number] [phone] (home)   58 year old female  PCP: Alejandra Helms    Allergies: Meperidine hcl and Other environmental allergy    Discharge Counseling:    Pharmacist met with patient (and/or family) today to review the medication portion of the After Visit Summary (with an emphasis on NEW medications) and to address patient's questions/concerns.    Summary of Education: Discussed increased statin dose to 20 mg, discussed ASA 81 mg/Plavix dual therapy x21 days, then  mg indefinitely.    Materials Provided:  MedCounselor sheets printed from Clinical Pharmacology on: Plavix    Discharge Medication Reconciliation:    It has been determined that the patient has an adequate supply of medications available or which can be obtained from the patient's preferred pharmacy, which HE/SHE has confirmed as: [phone]    Thank you for the consult.    Sabiha Farias RP........February 28, 2024 1:49 PM

## 2024-02-28 NOTE — PROGRESS NOTES
Admission Note    Data:  Pat Tobias admitted to 336 from emergency room at 0157.      Action:  Dr. Wheatley has been notified of admission. Pt oriented to unit, call light in reach.     Response:  Patient tolerated transfer well .

## 2024-02-28 NOTE — H&P
"Admit Date: 2/27/2024     History and Physical: Hospitalist Service    Chief Complaints:    Difficulty with speech and inability to stand    HPI:    58 years old female with a past medical history of hypertension, dyslipidemia, coronary artery disease and other medical issues presented to the emergency room for slurred speech with gait instability that was noticed by her colleagues.  The symptoms lasted approximately 7 minutes and resolved spontaneously.  Currently denies any loss of strength or speech issues.  Denies any chest pain or shortness of breath.  No weakness numbness .  Denies any cough wheezing headache or upper respiratory symptoms.  Denies any nausea vomiting.  CT brain and CT angio of the head was negative.  MRI of the brain shows no acute infarct.  Neurology were consulted and they recommended TIA workup and patient received a loading dose of aspirin 325 mg plus Plavix 300 mg.  She was admitted for further evaluation    Review of symptoms:  12 point review of system is negative unless otherwise stated in history of present illness    Clinically Significant Risk Factors Present on Admission                       # Overweight: Estimated body mass index is 25.7 kg/m  as calculated from the following:    Height as of this encounter: 1.727 m (5' 8\").    Weight as of this encounter: 76.7 kg (169 lb).                 Past Medical History:   Diagnosis Date    Family history of malignant neoplasm of digestive organ     10/2/2014    Personal history of other medical treatment (CODE)     No Comments Provided       Principal Problem:    TIA (transient ischemic attack)        Current Facility-Administered Medications:     acetaminophen (TYLENOL) tablet 650 mg, 650 mg, Oral, Q4H PRN, Bobby Palmer MD, 650 mg at 02/28/24 0610    aspirin (ASA) chewable tablet 81 mg, 81 mg, Oral, Daily, Bobby Palmer MD    atorvastatin (LIPITOR) tablet 20 mg, 20 mg, Oral, Daily, Corry " Bobby Jenkins MD    calcium carbonate (TUMS) chewable tablet 1,000 mg, 1,000 mg, Oral, 4x Daily PRN, Bobby Palmer MD    clopidogrel (PLAVIX) tablet 75 mg, 75 mg, Oral, Daily, Bobby Palmer MD    enoxaparin ANTICOAGULANT (LOVENOX) injection 40 mg, 40 mg, Subcutaneous, Daily, Bobby Palmer MD    enoxaparin ANTICOAGULANT (LOVENOX) injection 40 mg, 40 mg, Subcutaneous, Daily, Bobby Palmer MD    lidocaine (LMX4) cream, , Topical, Q1H PRN, Bobby Palmer MD    lidocaine 1 % 0.1-1 mL, 0.1-1 mL, Other, Q1H PRN, Bobby Palmer MD    metoprolol tartrate (LOPRESSOR) tablet 25 mg, 25 mg, Oral, BID, Bobby Palmer MD    nitroGLYcerin (NITROSTAT) sublingual tablet 0.4 mg, 0.4 mg, Sublingual, Q5 Min PRN, Bobby Palmer MD    ondansetron (ZOFRAN ODT) ODT tab 4 mg, 4 mg, Oral, Q6H PRN **OR** ondansetron (ZOFRAN) injection 4 mg, 4 mg, Intravenous, Q6H PRN, Bobby aPlmer MD    PARoxetine (PAXIL) tablet 10 mg, 10 mg, Oral, Daily, Bobby Palmer MD    prochlorperazine (COMPAZINE) injection 10 mg, 10 mg, Intravenous, Q6H PRN **OR** prochlorperazine (COMPAZINE) tablet 10 mg, 10 mg, Oral, Q6H PRN **OR** prochlorperazine (COMPAZINE) suppository 25 mg, 25 mg, Rectal, Q12H PRN, Bobby Palmer MD    senna-docusate (SENOKOT-S/PERICOLACE) 8.6-50 MG per tablet 1 tablet, 1 tablet, Oral, BID PRN **OR** senna-docusate (SENOKOT-S/PERICOLACE) 8.6-50 MG per tablet 2 tablet, 2 tablet, Oral, BID PRN, Bobby Palmer MD    sodium chloride (PF) 0.9% PF flush 3 mL, 3 mL, Intracatheter, Q8H, Bobby Palmer MD, 3 mL at 02/28/24 0249    sodium chloride (PF) 0.9% PF flush 3 mL, 3 mL, Intracatheter, q1 min prn, Bobby Palmer MD    vitamin  "B complex with vitamin C (STRESS TAB) tablet 1 tablet, 1 tablet, Oral, Daily, Bobby Palmer MD    Past Surgical History:   Procedure Laterality Date    APPENDECTOMY OPEN      1985    CHOLECYSTECTOMY      1987    COLONOSCOPY  10/07/2014    hyperplastic polyp--repeat 10 years    LAPAROSCOPIC TUBAL LIGATION      No Comments Provided    OTHER SURGICAL HISTORY      06/1997,[id number],SPINAL FUSION,L3/L4       Allergies   Allergen Reactions    Meperidine Hcl Anxiety, Diarrhea, Dizziness and Headache    Other Environmental Allergy Unknown       Family History   Problem Relation Age of Onset    Colon Cancer Maternal Grandfather         Cancer-colon    Colon Cancer Maternal Aunt         Cancer-colon    Colon Cancer Maternal Aunt         Cancer-colon    Breast Cancer No family hx of         Cancer-breast       Social History     Socioeconomic History    Marital status:      Spouse name: None    Number of children: None    Years of education: None    Highest education level: None   Tobacco Use    Smoking status: Former    Smokeless tobacco: Never   Substance and Sexual Activity    Alcohol use: Yes     Comment: Alcoholic Drinks/day: occasional wine    Drug use: Never    Sexual activity: Yes     Partners: Male   Social History Narrative    [employer]. . 3 kids. Youngest 18, plans college       Physical Exam:  Vitals:    02/27/24 2345 02/28/24 0000 02/28/24 0202 02/28/24 0555   BP: (!) 133/99 (!) 156/92 (!) 140/86 (!) 140/70   BP Location:   Left arm Left arm   Patient Position:   Semi-Douglas's Semi-Douglas's   Cuff Size:   Adult Regular Adult Regular   Pulse: 78 87 68 71   Resp:   16 16   Temp:   98  F (36.7  C) 97.8  F (36.6  C)   TempSrc:   Tympanic Tympanic   SpO2: 93% 94% 96% 94%   Weight:       Height:          BP (!) 140/70 (BP Location: Left arm, Patient Position: Semi-Douglas's, Cuff Size: Adult Regular)   Pulse 71   Temp 97.8  F (36.6  C) (Tympanic)   Resp 16   Ht 1.727 m (5' 8\")   Wt " 76.7 kg (169 lb)   LMP  (LMP Unknown)   SpO2 94%   BMI 25.70 kg/m    Systolic (24hrs), Av , Min:110 , Max:158   Diastolic (24hrs), Av, Min:70, Max:102    No intake or output data in the 24 hours ending 24 0615    General:    not in acute distress, not in pain  Head:  atraumatic, normocephalic, face symmetrical  Eye:  conjunctivae clear , anicteric  Ear:  normal external ears, grossly normal hearing  Neck:  supple, no JVD  Respiratory:  no respiratory distress, no labored respirations, no shortness of breath,  Lung Sounds:  bilateral: clear to auscultation  Cardiovascular:normal rate, regular rhythm, PMI normal, S1 - normal, S2 - normal splitting  Neurological Status:  alert, awake, oriented to person, oriented to place, oriented to time  Skin:  normal turgor, warm  PSYCH - good eye contact appears euthymic       I&O: No intake/output data recorded.    Lab Results:   CBC:   Lab Results   Component Value Date    WBC 7.9 2024    RBC 4.63 2024     BMP:   Lab Results   Component Value Date    POTASSIUM 3.6 2024    POTASSIUM 3.9 2017    CHLORIDE 104 2024    CHLORIDE 105 2017    BUN 13.3 2024    BUN 13 2017     CMP:  Lab Results   Component Value Date    POTASSIUM 3.6 2024    POTASSIUM 3.9 2017    CHLORIDE 104 2024    CHLORIDE 105 2017    ANIONGAP 12 2024    BUN 13.3 2024    BUN 13 2017    ALBUMIN 4.4 2017     Coagulation:   Lab Results   Component Value Date    INR 0.98 2024    PTT 29 2024          Assessment/Plan:    58 years old female with a past medical history of hypertension, dyslipidemia, coronary artery disease and other medical issues presented to the emergency room for slurred speech with gait instability that was noticed by her colleagues.  The symptoms lasted approximately 7 minutes and resolved spontaneously.  Currently denies any loss of strength or speech issues.  Denies any chest  pain or shortness of breath.  No weakness numbness .  Denies any cough wheezing headache or upper respiratory symptoms.  Denies any nausea vomiting.  CT brain and CT angio of the head was negative.  MRI of the brain shows no acute infarct.  Neurology were consulted and they recommended TIA workup and patient received a loading dose of aspirin 325 mg plus Plavix 300 mg.  She was admitted for further evaluation    1.  Transient ischemic attack with slurred speech and gait imbalance.  Symptoms have since resolved.  Monitor in the telemetry and check for any cardiac arrhythmias.  Follow-up with an echocardiogram and focus on risk factor reduction including better blood pressure control.  Check lipid panel/TSH level.  Follow-up eventually 30-day event monitor on discharge.  Consult PT/OT      2.  Hypertension resume the home dose of metoprolol and trend the blood pressures closely    3 dyslipidemia resume home statin and check lipid panel    4 depression resume the home Paxil    5 DVT prophylaxis will be with Lovenox    CODE STATUS is full    Patient is admitted under observation status  Our patient will be admitted under the hospitalist services and further management to be based on patient's clinical progress.    As the provider for the telehealth service, I attest that I introduced myself to the patient and provided my credentials. Based on review of the patient s chart and/or a discussion with members of the patient s treatment team, I determined that telemedicine via a real-time, two-way, interactive audio and video platform is an appropriate means of providing this service. The patient and I mutually agreed that this visit is appropriate for telemedicine as well.    The patient was located at [facility]. IBobby was at [place], [place]. The encounter was approximately 10 minutes. The nurse was present for the duration of the encounter.    Chart reviewed,labs and available imaging  reviewed,consultant notes reviewed. Previous available medical records have been reviewed  Care plan discussed with care team    I have seen and examined the patient today. Documentation for this visit was completed using a template. Everything documented was personally performed today with the necessary additions, deletions and changes made as appropriate with the diagnoses being listed in no particular order of clinical relevance.    Yola Cedeño MD MPh  Securely message with the Vocera Web Console (learn more here)  Text page via Presentation[employer] Paging/Directory

## 2024-02-29 ENCOUNTER — PATIENT OUTREACH (OUTPATIENT)
Dept: FAMILY MEDICINE | Facility: OTHER | Age: 59
End: 2024-02-29
Payer: COMMERCIAL

## 2024-02-29 NOTE — TELEPHONE ENCOUNTER
Patient has PCP elsewhere, no follow-up here. No TCM call required per policy.   Luci Alfaro RN on 2/29/2024 at 7:56 AM

## 2024-03-04 LAB
ATRIAL RATE - MUSE: 62 BPM
DIASTOLIC BLOOD PRESSURE - MUSE: NORMAL MMHG
INTERPRETATION ECG - MUSE: NORMAL
P AXIS - MUSE: 40 DEGREES
PR INTERVAL - MUSE: 172 MS
QRS DURATION - MUSE: 86 MS
QT - MUSE: 416 MS
QTC - MUSE: 422 MS
R AXIS - MUSE: 9 DEGREES
SYSTOLIC BLOOD PRESSURE - MUSE: NORMAL MMHG
T AXIS - MUSE: 54 DEGREES
VENTRICULAR RATE- MUSE: 62 BPM

## 2024-03-05 ENCOUNTER — OFFICE VISIT (OUTPATIENT)
Dept: INTERNAL MEDICINE | Facility: OTHER | Age: 59
End: 2024-03-05
Payer: COMMERCIAL

## 2024-03-05 ENCOUNTER — TELEPHONE (OUTPATIENT)
Dept: INTERNAL MEDICINE | Facility: OTHER | Age: 59
End: 2024-03-05

## 2024-03-05 VITALS
TEMPERATURE: 97 F | BODY MASS INDEX: 36.63 KG/M2 | OXYGEN SATURATION: 96 % | WEIGHT: 186.6 LBS | RESPIRATION RATE: 16 BRPM | SYSTOLIC BLOOD PRESSURE: 118 MMHG | HEART RATE: 68 BPM | DIASTOLIC BLOOD PRESSURE: 62 MMHG | HEIGHT: 60 IN

## 2024-03-05 DIAGNOSIS — I10 ESSENTIAL HYPERTENSION: ICD-10-CM

## 2024-03-05 DIAGNOSIS — E66.01 CLASS 2 SEVERE OBESITY DUE TO EXCESS CALORIES WITH SERIOUS COMORBIDITY AND BODY MASS INDEX (BMI) OF 36.0 TO 36.9 IN ADULT (H): ICD-10-CM

## 2024-03-05 DIAGNOSIS — E66.812 CLASS 2 SEVERE OBESITY DUE TO EXCESS CALORIES WITH SERIOUS COMORBIDITY AND BODY MASS INDEX (BMI) OF 36.0 TO 36.9 IN ADULT (H): ICD-10-CM

## 2024-03-05 DIAGNOSIS — R09.82 POST-NASAL DRAINAGE: ICD-10-CM

## 2024-03-05 DIAGNOSIS — G45.9 TIA (TRANSIENT ISCHEMIC ATTACK): Primary | ICD-10-CM

## 2024-03-05 DIAGNOSIS — J34.89 RHINORRHEA: ICD-10-CM

## 2024-03-05 DIAGNOSIS — E78.2 MIXED HYPERLIPIDEMIA: ICD-10-CM

## 2024-03-05 PROCEDURE — 99215 OFFICE O/P EST HI 40 MIN: CPT

## 2024-03-05 RX ORDER — PAROXETINE 30 MG/1
30 TABLET, FILM COATED ORAL EVERY MORNING
COMMUNITY
Start: 2024-01-16 | End: 2024-05-02

## 2024-03-05 ASSESSMENT — PAIN SCALES - GENERAL: PAINLEVEL: MILD PAIN (3)

## 2024-03-05 NOTE — TELEPHONE ENCOUNTER
Lesli-patient needs a letter to return to work     Please call and advise    Thank You    Aurelia Castano on 3/5/2024 at 11:52 AM     On bactrim. Sens pending. Reviewed the risks, benefits, common complications of circumcision for her son with the patient. She had an opportunity to ask questions and verbalized her understanding of our conversation and consent for circumcision. Procedure Note    Procedure: Circumcision   Attending: Faisal Chavez MD     Infant confirmed to be greater than 12 hours in age and vitamin K given. Risks and benefits of circumcision explained to mother. All questions answered. Informed consent obtained. Time out performed to verify infant and procedure. Infant prepped and draped in normal sterile fashion. Dorsal Block Anesthesia with 1% lidocaine. Mogen clamp used to perform procedure. Hemostasis noted. Infant tolerated the procedure well. Sterile petroleum gauze dressing applied to circumcised area. Estimated blood loss: minimal.      Complications: none. Dr. Berry Holt was present for the entire procedure.      Faisal Chavez MD  Obgyn Attending  [facility]

## 2024-03-05 NOTE — NURSING NOTE
Chief Complaint   Patient presents with    Hospital F/U     Transient neurologic symptoms        Initial /62   Pulse 68   Temp 97  F (36.1  C) (Tympanic)   Resp 16   Ht 1.524 m (5')   Wt 84.6 kg (186 lb 9.6 oz)   LMP  (LMP Unknown)   SpO2 96%   BMI 36.44 kg/m   Estimated body mass index is 36.44 kg/m  as calculated from the following:    Height as of this encounter: 1.524 m (5').    Weight as of this encounter: 84.6 kg (186 lb 9.6 oz).  Medication Reconciliation: complete    Brunilda Perez LPN on 3/5/2024 at 8:50 AM

## 2024-03-05 NOTE — COMMUNITY RESOURCES LIST (ENGLISH)
03/05/2024   [facility] [employer]  N/A  For questions about this resource list or additional care needs, please contact your primary care clinic or care manager.  Phone: [phone]   Email: N/A   Address: [address] [place] [id number]   Hours: N/A        Food and Nutrition       Food pantry  1  [facility] Distance: 5.19 miles      In-Person   [address]  [place] [address]  Language: English  Hours: Thu 4:00 PM - 6:00 PM  Fees: Free   Phone: (808) 869-6614 Website: https://[employer]/     2  [facility] [employer] Distance: 22.4 miles      In-Person   [address] [place] [address]  Language: English  Hours: Mon - Thu 11:00 AM - 3:30 PM  Fees: Free   Phone: (452) 495-7787 Email: info@IMT Website: https://IMT     SNAP application assistance  3  [facility] [employer] [facility] Distance: 23.28 miles      In-Person, Phone/Virtual   [address] [place] [address]  Language: English  Hours: Mon - Fri 8:00 AM - 4:30 PM  Fees: Free   Phone: (853) 601-2884 Website: https://www.[employer]/partner/wpnoumfzv-rluclfdk-puwocypfdoo-agency-[facility]          Important Numbers & Websites       Emergency Services   [address]  [place] [address]  Poison Control   (835) 694-2294  Suicide Prevention Lifeline   (909) 623-3209 (TALK)  Child Abuse Hotline   (951) 502-7944 (4-A-Child)  Sexual Assault Hotline   (592) 485-7871 (HOPE)  National Runaway Safeline   (365) 408-3922 (RUNAWAY)  All-Options Talkline   (176) 941-3721  Substance Abuse Referral   (450) 497-4693 (HELP)

## 2024-03-05 NOTE — PATIENT INSTRUCTIONS
Continue Aspirin 81mg + Plavix 75mg x 21 days; then discontinue Plavix and continue Aspirin at 325mg dose daily indefinitely     Referral placed for neurology for 6-8 weeks. They will call you to schedule.    Referral placed for ENT to follow-up with sinus etc. They will also call you to schedule.    Please establish with a primary care provider here at [facility].

## 2024-03-05 NOTE — PROGRESS NOTES
Mounika Stewart is a 58 year old, presenting for the following health issues:  Hospital F/U (Transient neurologic symptoms )      3/5/2024     8:39 AM   Additional Questions   Roomed by Brunilda LOCKE LPN   Accompanied by Self       ICD-10-CM    1. TIA (transient ischemic attack)  G45.9 Adult Neurology  Referral      2. Class 2 severe obesity due to excess calories with serious comorbidity and body mass index (BMI) of 36.0 to 36.9 in adult (H)  E66.01     Z68.36       3. Rhinorrhea  J34.89 Adult ENT  Referral      4. Mixed hyperlipidemia  E78.2       5. Essential hypertension  I10       6. Post-nasal drainage  R09.82       Patient here for a follow-up appointment to hospital discharge. She was admitted on 2/27/24 to 2/28/24 for transient neurological symptoms which was not able to be determined if it was due to a TIA or migraine variant. She arrived at the ER with complaints of having an episode that lasted approx 7 min in which she was not able to stand and had slurred speech. This resolved spontaneously but had persisting numbness/tingling and a strange sensation in her brain.  There was no evidence of acute infarct and no residual deficits.  She was also treated in the ER with migraine cocktail of Toradol, Benadryl, and Compazine.  She was observed overnight and was seen by a stroke neurologist who recommended the patient receive 81 mg of aspirin +75 mg of Plavix for 21 days and then be on 325 mg of aspirin daily indefinitely.  Her Lipitor was increased to 20 mg daily and she is tolerating this well.  The recommendation was for her to also follow-up with neurology in 6 to 8 weeks.  I placed a referral for this.  The patient has a significant history with both parents having strokes at an early age.  The patient has had a history of chronic rhinorrhea with possible nasal polyps.  She would like a referral to ENT to see if some of her symptoms are related to this.  Referral was placed.  Vital  signs including blood pressure, within normal limits at today's appointment.  No acute concerns although the patient does continue to have a wavy sensation in her head.  She has no neurological deficits.  Education given on stroke including when to return to the ER.  Patient verbalizes understanding.  She also would like to establish primary care at [facility] and was to schedule an appointment for such prior to leaving today.    Via the Health Maintenance questionnaire, the patient has reported the following services have been completed -Colonscopy, this information has been sent to the abstraction team.  History of Present Illness       Reason for visit:  Hospital follow up and continuem of my healthcare    She eats 2-3 servings of fruits and vegetables daily.She consumes 0 sweetened beverage(s) daily.She exercises with enough effort to increase her heart rate 30 to 60 minutes per day.  She exercises with enough effort to increase her heart rate 4 days per week.   She is taking medications regularly.    Here for follow-up to hospital stay from 2/27/24 to 2/28/24. Had symptoms of TIA but not common symptoms and also questioned if symptoms were related to migraine. Imaging inconclusive of TIA. Treated with migraine treatment as well and had resolution of symptoms.  Continues to have symptoms that are described whooshing going through her head. No other problems with speech or weakness or neurological changes.  Parents both had strokes at early age. Father passed away from second stroke at 68. Mom had 1st stroke at 58.   Patient was a past smoker for many years. Quit in 2009.    Continues to have headaches that she feels are her normal baseline.    She has concern for chronic sinus issues and wonders if some of her symptoms are related to this.     Has cardiac monitor on presently - will wear for 30 days  Statin medication was increased - tolerating well.     Hospital Follow-up Visit:  Hospital/Nursing Home/IP  Rehab Facility: [facility]  Date of Admission: 2-  Date of Discharge: 2-  Reason(s) for Admission: Transient neurologic symptoms    Was your hospitalization related to COVID-19? No   Problems taking medications regularly:  None  Medication changes since discharge: Aspirin, Lipitor   Problems adhering to non-medication therapy:  None    Summary of hospitalization:  [facility] discharge summary reviewed  Diagnostic Tests/Treatments reviewed.  Yes  Other Healthcare Providers Involved in Patient s Care:         Specialist appointment - Neurology and ENT referrals. Recommended neurology follow-up from hospital stay in 6-8 weeks.  Update since discharge: Improved but continues to have symptoms of wavy feeling in her head.     Plan of care communicated with patient     Review of Systems  CONSTITUTIONAL: NEGATIVE for fever, chills, change in weight  INTEGUMENTARY/SKIN: NEGATIVE for worrisome rashes, moles or lesions  EYES: NEGATIVE for vision changes or irritation  ENT/MOUTH: NEGATIVE for ear, mouth and throat problems  RESP: NEGATIVE for significant cough or SOB  BREAST: NEGATIVE for masses, tenderness or discharge  CV: NEGATIVE for chest pain, palpitations or peripheral edema  GI: NEGATIVE for nausea, abdominal pain, heartburn, or change in bowel habits  : NEGATIVE for frequency, dysuria, or hematuria  MUSCULOSKELETAL: NEGATIVE for significant arthralgias or myalgia  NEURO: NEGATIVE for weakness, dizziness or paresthesias  ENDOCRINE: NEGATIVE for temperature intolerance, skin/hair changes  HEME: NEGATIVE for bleeding problems  PSYCHIATRIC: NEGATIVE for changes in mood or affect      Objective    /62   Pulse 68   Temp 97  F (36.1  C) (Tympanic)   Resp 16   Ht 1.524 m (5')   Wt 84.6 kg (186 lb 9.6 oz)   LMP  (LMP Unknown)   SpO2 96%   BMI 36.44 kg/m    Body mass index is 36.44 kg/m .  Physical Exam  Constitutional:       General: She is not in acute  distress.     Appearance: Normal appearance. She is obese. She is not ill-appearing.   HENT:      Head: Normocephalic.      Right Ear: Tympanic membrane, ear canal and external ear normal.      Left Ear: Tympanic membrane, ear canal and external ear normal.      Nose: No congestion.      Mouth/Throat:      Mouth: Mucous membranes are moist.      Pharynx: No oropharyngeal exudate.   Eyes:      Extraocular Movements: Extraocular movements intact.      Pupils: Pupils are equal, round, and reactive to light.   Cardiovascular:      Rate and Rhythm: Normal rate and regular rhythm.      Pulses: Normal pulses.      Heart sounds: Normal heart sounds. No murmur heard.  Pulmonary:      Effort: Pulmonary effort is normal. No respiratory distress.      Breath sounds: Normal breath sounds.   Abdominal:      General: Bowel sounds are normal.      Palpations: Abdomen is soft.   Musculoskeletal:         General: Normal range of motion.      Cervical back: Normal range of motion and neck supple. No rigidity.   Lymphadenopathy:      Cervical: No cervical adenopathy.   Skin:     General: Skin is warm and dry.      Capillary Refill: Capillary refill takes less than 2 seconds.   Neurological:      General: No focal deficit present.      Mental Status: She is alert and oriented to person, place, and time.      Cranial Nerves: No cranial nerve deficit.      Sensory: No sensory deficit.      Motor: No weakness.      Gait: Gait normal.   Psychiatric:         Behavior: Behavior normal.        Signed Electronically by: LAMBERTO Milan CNP

## 2024-03-12 NOTE — PROGRESS NOTES
Patient called to evaluate if she has had any cardiac symptoms with the reported bradycardic episodes per cardiac monitor.  She denies any symptoms.  She reports that she was sleeping at the times noted per cardiac monitor.  She states that she actually has been feeling better lately with less of the funny feeling in her head.  I requested for more information from the cardiac monitor company including length of time she is bradycardic as well as rhythm while she has bradycardia.  I will continue to follow-up with the patient as needed for further recommendations once I know more information.  I would consider holding her metoprolol or placing referral to cardiology if abnormal rhythm.

## 2024-03-17 ENCOUNTER — MYC MEDICAL ADVICE (OUTPATIENT)
Dept: INTERNAL MEDICINE | Facility: OTHER | Age: 59
End: 2024-03-17
Payer: COMMERCIAL

## 2024-03-17 DIAGNOSIS — G45.9 TIA (TRANSIENT ISCHEMIC ATTACK): Primary | ICD-10-CM

## 2024-03-18 NOTE — TELEPHONE ENCOUNTER
3/5/2024  ENT and Neurology referrals placed.    Louis Goyal at [facility].  Called [address] about scheduling the ENT with Jay Jay.   Jay Jay's office schedules all.  [facility]

## 2024-04-01 PROCEDURE — 93228 REMOTE 30 DAY ECG REV/REPORT: CPT | Performed by: INTERNAL MEDICINE

## 2024-04-09 DIAGNOSIS — I25.2 HISTORY OF NON-ST ELEVATION MYOCARDIAL INFARCTION (NSTEMI): Primary | ICD-10-CM

## 2024-04-12 RX ORDER — NITROGLYCERIN 0.4 MG/1
0.4 TABLET SUBLINGUAL EVERY 5 MIN PRN
Qty: 25 TABLET | Refills: 0 | Status: SHIPPED | OUTPATIENT
Start: 2024-04-12

## 2024-04-12 NOTE — TELEPHONE ENCOUNTER
[id number] sent Rx request for the following:      Requested Prescriptions   Pending Prescriptions Disp Refills    nitroGLYcerin (NITROSTAT) 0.4 MG sublingual tablet       Sig: Place 1 tablet (0.4 mg) under the tongue every 5 minutes as needed for chest pain       Nitrates Failed - 4/12/2024  2:12 PM        Failed - Sublingual nitro order needs review     If refill exceeds 1 bottle per month, please forward request to provider.        Failed - Always fail criteria - needs review     Review chart. Forward refill request to provider if patient has exceeded one bottle per 3 months.         Failed - Medication indicated for associated diagnosis     Medication is associated with one or more of the following diagnoses:    Anal fissure   Angina pectoris   Cardiac syndrome X   Congestive heart failure   Hypertension   Myocardial infarction   Pulmonary edema   Pulmonary hypertension   Historical  Last Office Visit:              3/5/24   Future Office visit:           None    Unable to complete prescription refill per RN Medication Refill Policy.     Alexandra Pastrana RN .............. 4/12/2024  2:43 PM

## 2024-04-19 ENCOUNTER — OFFICE VISIT (OUTPATIENT)
Dept: INTERNAL MEDICINE | Facility: OTHER | Age: 59
End: 2024-04-19
Payer: COMMERCIAL

## 2024-04-19 ENCOUNTER — NURSE TRIAGE (OUTPATIENT)
Dept: INTERNAL MEDICINE | Facility: OTHER | Age: 59
End: 2024-04-19
Payer: COMMERCIAL

## 2024-04-19 ENCOUNTER — HOSPITAL ENCOUNTER (OUTPATIENT)
Dept: GENERAL RADIOLOGY | Facility: OTHER | Age: 59
Discharge: HOME | End: 2024-04-19
Payer: COMMERCIAL

## 2024-04-19 VITALS
HEART RATE: 77 BPM | TEMPERATURE: 97.2 F | BODY MASS INDEX: 35.62 KG/M2 | OXYGEN SATURATION: 97 % | SYSTOLIC BLOOD PRESSURE: 122 MMHG | RESPIRATION RATE: 16 BRPM | WEIGHT: 182.4 LBS | DIASTOLIC BLOOD PRESSURE: 88 MMHG

## 2024-04-19 DIAGNOSIS — R39.9 UTI SYMPTOMS: Primary | ICD-10-CM

## 2024-04-19 DIAGNOSIS — R11.2 NAUSEA AND VOMITING, UNSPECIFIED VOMITING TYPE: ICD-10-CM

## 2024-04-19 DIAGNOSIS — M54.6 ACUTE RIGHT-SIDED THORACIC BACK PAIN: ICD-10-CM

## 2024-04-19 LAB
ALBUMIN UR-MCNC: 10 MG/DL
ANION GAP SERPL CALCULATED.3IONS-SCNC: 9 MMOL/L (ref 7–15)
APPEARANCE UR: CLEAR
BILIRUB UR QL STRIP: NEGATIVE
BUN SERPL-MCNC: 10.9 MG/DL (ref 8–23)
CALCIUM SERPL-MCNC: 8.8 MG/DL (ref 8.6–10)
CHLORIDE SERPL-SCNC: 102 MMOL/L (ref 98–107)
COLOR UR AUTO: YELLOW
CREAT SERPL-MCNC: 0.5 MG/DL (ref 0.51–0.95)
DEPRECATED HCO3 PLAS-SCNC: 27 MMOL/L (ref 22–29)
EGFRCR SERPLBLD CKD-EPI 2021: >90 ML/MIN/1.73M2
GLUCOSE SERPL-MCNC: 138 MG/DL (ref 70–99)
GLUCOSE UR STRIP-MCNC: NEGATIVE MG/DL
HGB UR QL STRIP: NEGATIVE
KETONES UR STRIP-MCNC: NEGATIVE MG/DL
LEUKOCYTE ESTERASE UR QL STRIP: NEGATIVE
MUCOUS THREADS #/AREA URNS LPF: PRESENT /LPF
NITRATE UR QL: NEGATIVE
PH UR STRIP: 6 [PH] (ref 5–9)
POTASSIUM SERPL-SCNC: 3.4 MMOL/L (ref 3.4–5.3)
RBC URINE: 3 /HPF
SODIUM SERPL-SCNC: 138 MMOL/L (ref 135–145)
SP GR UR STRIP: 1.02 (ref 1–1.03)
SQUAMOUS EPITHELIAL: 5 /HPF
UROBILINOGEN UR STRIP-MCNC: NORMAL MG/DL
WBC URINE: 3 /HPF

## 2024-04-19 PROCEDURE — 82374 ASSAY BLOOD CARBON DIOXIDE: CPT | Mod: ZL

## 2024-04-19 PROCEDURE — 81001 URINALYSIS AUTO W/SCOPE: CPT | Mod: ZL

## 2024-04-19 PROCEDURE — 82565 ASSAY OF CREATININE: CPT | Mod: ZL

## 2024-04-19 PROCEDURE — 99213 OFFICE O/P EST LOW 20 MIN: CPT

## 2024-04-19 PROCEDURE — 36415 COLL VENOUS BLD VENIPUNCTURE: CPT | Mod: ZL

## 2024-04-19 PROCEDURE — 74018 RADEX ABDOMEN 1 VIEW: CPT

## 2024-04-19 RX ORDER — ONDANSETRON 4 MG/1
4 TABLET, ORALLY DISINTEGRATING ORAL EVERY 8 HOURS PRN
Qty: 10 TABLET | Refills: 0 | Status: CANCELLED | OUTPATIENT
Start: 2024-04-19

## 2024-04-19 RX ORDER — HYDROCODONE BITARTRATE AND ACETAMINOPHEN 5; 325 MG/1; MG/1
1 TABLET ORAL EVERY 6 HOURS PRN
Qty: 10 TABLET | Refills: 0 | Status: CANCELLED | OUTPATIENT
Start: 2024-04-19 | End: 2024-04-22

## 2024-04-19 ASSESSMENT — PAIN SCALES - GENERAL: PAINLEVEL: MILD PAIN (3)

## 2024-04-19 NOTE — PROGRESS NOTES
Mounika Stewart is a 58 year old, presenting for the following health issues:  Flank Pain (Right sided )      ICD-10-CM    1. UTI symptoms  R39.9 UA with Microscopic reflex to Culture     UA with Microscopic reflex to Culture     Basic Metabolic Panel     XR KUB     CANCELED: XR Abdomen Upright Only      2. Acute right-sided thoracic back pain  M54.6 Basic Metabolic Panel     XR KUB     CANCELED: XR Abdomen Upright Only      3. Nausea and vomiting, unspecified vomiting type  R11.2       Very pleasant 58-year-old female presents to the clinic today for right sided costovertebral pain that is more severe at night, but is persistent.  She has also had nausea with vomiting.  She is eating and drinking okay.  She denies blood in her urine, frequency, urgency, or pain with urination.  No fevers, or chills.  BMP unremarkable.  KUB x-ray unremarkable for kidney stones.  Offered patient CT scan but she declines at this time.  I also offered her a short course of pain medicine as well as Zofran, however she declines at this time.  She can take Tylenol as needed for pain as well as heat and ice.  I recommended staying adequately hydrated.  She is to return to the clinic if symptoms do not improve or if they become worse.  She is agreeable to this plan.    History of Present Illness       Reason for visit:  Possible kidney stones  Symptom onset:  1-2 weeks ago  Symptoms include:  Horrible pain evenings through mornings  Symptom intensity:  Severe  Symptom progression:  Worsening    She eats 0-1 servings of fruits and vegetables daily.She consumes 0 sweetened beverage(s) daily.She exercises with enough effort to increase her heart rate 30 to 60 minutes per day.  She exercises with enough effort to increase her heart rate 3 or less days per week.   She is taking medications regularly.     Thursday night was really sick. Pain in right costovertebral angle and nausea with vomiting for 3 days. Pain worse at night and carries  through morning. Gets better throughout the day but still there.   No kidney stones in the past, but her mother struggled with kidney stones.  Eating and drinking ok.  No blood in urine.   No burning with urination, frequency, or urgency.  No fevers that she is aware of, does have hot flashes.        Objective    LMP  (LMP Unknown)   There is no height or weight on file to calculate BMI.  Physical Exam  Constitutional:       General: She is not in acute distress.     Appearance: Normal appearance. She is obese. She is not ill-appearing.   HENT:      Head: Normocephalic.   Cardiovascular:      Rate and Rhythm: Normal rate and regular rhythm.      Pulses: Normal pulses.      Heart sounds: Normal heart sounds.   Pulmonary:      Effort: Pulmonary effort is normal.   Abdominal:      General: Bowel sounds are normal. There is no distension.      Tenderness: There is no abdominal tenderness.   Musculoskeletal:         General: Normal range of motion.      Comments: Tenderness elicited at the right costovertebral angle with palpation.   Neurological:      Mental Status: She is alert and oriented to person, place, and time.        Results for orders placed or performed in visit on 04/19/24   XR KUB     Status: None    Narrative    XR KUB    HISTORY: UTI symptoms; Acute right-sided thoracic back pain .    COMPARISON: None.    TECHNIQUE: 2 views of the abdomen.    FINDINGS:    The lung bases are clear. No subdiaphragmatic air. The bowel gas  pattern is notable for mildly prominent stool in the proximal and  transverse colon.  Postsurgical changes are seen at L4-5 and L5-S1.  Degenerative changes are present elsewhere in the lumbar spine and SI  joints. Multiple phleboliths are noted.    TIMUR FUENTES MD         SYSTEM ID:  [id number]   UA with Microscopic reflex to Culture     Status: Abnormal    Specimen: Urine, Clean Catch   Result Value Ref Range    Color Urine Yellow Colorless, Straw, Light Yellow, Yellow    Appearance  Urine Clear Clear    Glucose Urine Negative Negative mg/dL    Bilirubin Urine Negative Negative    Ketones Urine Negative Negative mg/dL    Specific Gravity Urine 1.024 1.000 - 1.030    Blood Urine Negative Negative    pH Urine 6.0 5.0 - 9.0    Protein Albumin Urine 10 (A) Negative mg/dL    Urobilinogen Urine Normal Normal, 2.0 mg/dL    Nitrite Urine Negative Negative    Leukocyte Esterase Urine Negative Negative    Mucus Urine Present (A) None Seen /LPF    RBC Urine 3 (H) <=2 /HPF    WBC Urine 3 <=5 /HPF    Squamous Epithelials Urine 5 (H) <=1 /HPF    Narrative    Urine Culture not indicated   Basic Metabolic Panel     Status: Abnormal   Result Value Ref Range    Sodium 138 135 - 145 mmol/L    Potassium 3.4 3.4 - 5.3 mmol/L    Chloride 102 98 - 107 mmol/L    Carbon Dioxide (CO2) 27 22 - 29 mmol/L    Anion Gap 9 7 - 15 mmol/L    Urea Nitrogen 10.9 8.0 - 23.0 mg/dL    Creatinine 0.50 (L) 0.51 - 0.95 mg/dL    GFR Estimate >90 >60 mL/min/1.73m2    Calcium 8.8 8.6 - 10.0 mg/dL    Glucose 138 (H) 70 - 99 mg/dL       Signed Electronically by: LAMBERTO Milan CNP

## 2024-04-19 NOTE — TELEPHONE ENCOUNTER
"S-(situation): patient has had right sided flank pain since 4/11/24, would like appointment today.    B-(background): Patient has had flank pain on right side since 4/11/24. Was vomiting last Saturday and Sunday. Not this week however. Denies fever, blood in urine or pink tinged urine, fever, or pain with urination. Has had 10/10 pain when trying to sleep.    A-(assessment): Possible kidney stone    R-(recommendations): Spoke with requested provider, add on appointment approved. Patient called and will follow up in clinic today.    Luci Alfaro RN on 4/19/2024 at 10:45 AM        Reason for Disposition   MODERATE pain (e.g., interferes with normal activities or awakens from sleep)    Additional Information   Negative: Passed out (i.e., lost consciousness, collapsed and was not responding)   Negative: Shock suspected (e.g., cold/pale/clammy skin, too weak to stand, low BP, rapid pulse)   Negative: Sounds like a life-threatening emergency to the triager   Negative: Followed a major injury to the back (e.g., MVA, fall > 10 feet or 3 meters, penetrating injury, etc.)   Negative: Upper, mid or lower back pain that occurs mainly in the midline   Negative: SEVERE pain (e.g., excruciating, scale 8-10) and present > 1 hour   Negative: Sudden onset of severe flank pain and age > 60 years   Negative: Abdominal pain and age > 60 years   Negative: Unable to urinate (or only a few drops) > 4 hours and bladder feels very full (e.g., palpable bladder or strong urge to urinate)   Negative: Pain radiates into groin, scrotum   Negative: Blood in urine (red, pink, or tea-colored)   Negative: Vomiting   Negative: Weakness of a leg or foot (e.g., unable to bear weight, dragging foot)   Negative: Patient sounds very sick or weak to the triager   Negative: Fever > 100.4 F (38.0 C)   Negative: Pain or burning with passing urine (urination)    Answer Assessment - Initial Assessment Questions  1. LOCATION: \"Where does it hurt?\" (e.g., " "left, right)      Right  2. ONSET: \"When did the pain start?\"      4/11/24  3. SEVERITY: \"How bad is the pain?\" (e.g., Scale 1-10; mild, moderate, or severe)    - MILD (1-3): doesn't interfere with normal activities     - MODERATE (4-7): interferes with normal activities or awakens from sleep     - SEVERE (8-10): excruciating pain and patient unable to do normal activities (stays in bed)        10 at night  4. PATTERN: \"Does the pain come and go, or is it constant?\"       constant  5. CAUSE: \"What do you think is causing the pain?\"      Kidney stone  6. OTHER SYMPTOMS:  \"Do you have any other symptoms?\" (e.g., fever, abdomen pain, vomiting, leg weakness, burning with urination, blood in urine)      none  7. PREGNANCY:  \"Is there any chance you are pregnant?\" \"When was your last menstrual period?\"      no    Protocols used: Flank Pain-A-[place]    "

## 2024-04-19 NOTE — NURSING NOTE
Patient states that she has been having right sided flank pain that started a week ago.  Pain is at night and early mornings.

## 2024-05-02 DIAGNOSIS — E78.2 MIXED HYPERLIPIDEMIA: Primary | ICD-10-CM

## 2024-05-02 DIAGNOSIS — F41.9 ANXIETY: Primary | ICD-10-CM

## 2024-05-02 DIAGNOSIS — G45.9 TIA (TRANSIENT ISCHEMIC ATTACK): ICD-10-CM

## 2024-05-02 NOTE — TELEPHONE ENCOUNTER
[id number] sent Rx request for the following:      Requested Prescriptions   Pending Prescriptions Disp Refills    atorvastatin (LIPITOR) 10 MG tablet       Sig: TAKE 1 TABLET (10MG) BY MOUTH ONCE DAILY WITH SUPPER.       There is no refill protocol information for this order      This prescription was discontinued on 02/28/24.    Last Prescription Date:   05/01/2020  Last Fill Qty/Refills:         90, R-3    Last Office Visit:              04/19/24 (Matthew)   Future Office visit:          None.      Yvette Tesfaye RN on 5/2/2024 at 4:57 PM

## 2024-05-02 NOTE — TELEPHONE ENCOUNTER
Requested Prescriptions   Pending Prescriptions Disp Refills    PARoxetine (PAXIL) 30 MG tablet       Sig: Take 1 tablet (30 mg) by mouth every morning   Historical        atorvastatin (LIPITOR) 20 MG tablet 30 tablet 0     Sig: Take 1 tablet (20 mg) by mouth at bedtime   Last Prescription Date:   2/28/24  Last Fill Qty/Refills:         30, R-0 (Med-Surg)      Last Office Visit:              4/19/24   Future Office visit:           None    PCP Alejandra Helms. Pt saw Lola Castro for hospital follow up on 3/5/24 and again on 4/19/24 for UTI sx. Pt wasn't available to confirm PCP. Pt is out. Routing to covering provider, in the absence of Lola Castro for review.    Unable to complete prescription refill per RN Medication Refill Policy.     Alexandra Pastrana RN .............. 5/2/2024  4:37 PM

## 2024-05-02 NOTE — TELEPHONE ENCOUNTER
Reason for call: Medication or medication refill    Have you contacted your pharmacy regarding this refill? yes    If No, direct the patient to contact the Pharmacy and discontinue telephone note using Erroneous Encounter.  If Yes, continue.    Name of medication requested: paroxetine, atorvastatin     How many days of medication do you have left? Pt is out    What pharmacy do you use? [id number]    Preferred method for responding to this message: Telephone Call    Phone number patient can be reached at: Cell number on file:    Telephone Information:   Mobile [phone]       If we cannot reach you directly, may we leave a detailed response at the number you provided? Yes

## 2024-05-03 PROBLEM — F41.9 ANXIETY: Status: ACTIVE | Noted: 2023-03-16

## 2024-05-03 RX ORDER — PAROXETINE 30 MG/1
30 TABLET, FILM COATED ORAL EVERY MORNING
Qty: 90 TABLET | Refills: 3 | Status: SHIPPED | OUTPATIENT
Start: 2024-05-03

## 2024-05-03 RX ORDER — ATORVASTATIN CALCIUM 20 MG/1
20 TABLET, FILM COATED ORAL AT BEDTIME
Qty: 90 TABLET | Refills: 3 | Status: SHIPPED | OUTPATIENT
Start: 2024-05-03

## 2024-05-04 ENCOUNTER — HEALTH MAINTENANCE LETTER (OUTPATIENT)
Age: 59
End: 2024-05-04

## 2024-05-06 RX ORDER — ATORVASTATIN CALCIUM 10 MG/1
TABLET, FILM COATED ORAL
OUTPATIENT
Start: 2024-05-06

## 2025-04-21 ENCOUNTER — OFFICE VISIT (OUTPATIENT)
Dept: FAMILY MEDICINE | Facility: OTHER | Age: 60
End: 2025-04-21
Payer: COMMERCIAL

## 2025-04-21 ENCOUNTER — HOSPITAL ENCOUNTER (OUTPATIENT)
Dept: GENERAL RADIOLOGY | Facility: OTHER | Age: 60
Discharge: HOME | End: 2025-04-21
Payer: COMMERCIAL

## 2025-04-21 VITALS
OXYGEN SATURATION: 94 % | SYSTOLIC BLOOD PRESSURE: 112 MMHG | WEIGHT: 191 LBS | BODY MASS INDEX: 37.5 KG/M2 | HEIGHT: 60 IN | TEMPERATURE: 98.3 F | HEART RATE: 62 BPM | DIASTOLIC BLOOD PRESSURE: 68 MMHG | RESPIRATION RATE: 20 BRPM
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
## 2025-04-21 DIAGNOSIS — M79.641 RIGHT HAND PAIN: ICD-10-CM

## 2025-04-21 DIAGNOSIS — S62.346A NONDISPLACED FRACTURE OF BASE OF FIFTH METACARPAL BONE, RIGHT HAND, INITIAL ENCOUNTER FOR CLOSED FRACTURE: Primary | ICD-10-CM

## 2025-04-21 PROCEDURE — 73130 X-RAY EXAM OF HAND: CPT | Mod: RT

## 2025-04-21 PROCEDURE — 73110 X-RAY EXAM OF WRIST: CPT | Mod: RT

## 2025-04-21 PROCEDURE — 73110 X-RAY EXAM OF WRIST: CPT | Mod: 26 | Performed by: RADIOLOGY

## 2025-04-21 ASSESSMENT — PAIN SCALES - GENERAL: PAINLEVEL_OUTOF10: MODERATE PAIN (5)

## 2025-04-21 NOTE — PROGRESS NOTES
ASSESSMENT/PLAN:    I have reviewed the nursing notes.  I have reviewed the findings, diagnosis, plan and need for follow up with the patient.    1. Fall, initial encounter  2. Right hand pain    - XR Hand Right G/E 3 Views- Nondisplaced fracture of the fifth metacarpal base. There is intra-articular involvement. There is adjacent soft tissue edemaper radiologist.    - XR Wrist Right G/E 3 Views- There is a cortical lucency involving the proximal articular surface of the lunate. The radiocarpal joint is congruent per radiologist.    3. Nondisplaced fracture of base of fifth metacarpal bone, right hand, initial encounter for closed fracture (Primary)    - Wrist/Arm/Hand Bracing Supplies Order Wrist Brace; Right; non-thumb spica    - Orthopedic  Referral; Future    - Please read the attached information on RICE, hand fracture, metacarpal fracture rehab exercises and splint use for at home care treatment as discussed in the clinic today.    - May use over-the-counter Tylenol as needed for pain or fever    - Discussed warning signs/symptoms indicative of need to f/u    - Follow up if symptoms persist or worsen or concerns    - I explained my diagnostic considerations and recommendations to the patient, who voiced understanding and agreement with the treatment plan. All questions were answered. We discussed potential side effects of any prescribed or recommended therapies, as well as expectations for response to treatments.    LAMBERTO An CNP  4/21/2025  10:20 AM    HPI:    Pat Ramires is a 59 year old female who presents to [facility] today for concerns of right wrist/hand injury from a fall last Saturday.  Patient states that last Saturday she was walking while her granddaughters were riding bike and she tripped on a sidewalk lip and fell on the concrete.  Patient states that it happened so quickly she does not remember the details exactly but does suspect that she attempted to break her fall  with her right hand.  Patient states that her right hand swelled immediately and has been waiting for the swelling to come down some before getting x-rays.  Patient does have history of pain in her right hand with trigger finger and arthritis but wants to make sure that the pain is not coming from a fracture at this point.  Patient states that he is experiencing a lot more pain when she has to use her right hand to grab a hold of for example her pants to pull up and button or to grab a hold of her coffee cup.  Patient states that the pain is in her lateral right hand and shoots up into her right wrist area. At home care treatment consists of ice only.    Past Medical History:   Diagnosis Date    Family history of malignant neoplasm of digestive organ     10/2/2014    Personal history of other medical treatment (CODE)     No Comments Provided     Past Surgical History:   Procedure Laterality Date    APPENDECTOMY OPEN      1985    CHOLECYSTECTOMY      1987    COLONOSCOPY  10/07/2014    hyperplastic polyp--repeat 10 years    COLONOSCOPY  05/23/2022    10 yr follow up    LAPAROSCOPIC TUBAL LIGATION      No Comments Provided    OTHER SURGICAL HISTORY      06/1997,[id number],SPINAL FUSION,L3/L4     Social History     Tobacco Use    Smoking status: Former    Smokeless tobacco: Never    Tobacco comments:     Quit in 2009    Substance Use Topics    Alcohol use: Yes     Comment: Alcoholic Drinks/day: occasional wine     Current Outpatient Medications   Medication Sig Dispense Refill    acetaminophen (TYLENOL) 500 MG tablet Take 1,000 mg by mouth every 6 hours      aspirin (ASA) 325 MG EC tablet Take 1 tablet (325 mg) by mouth daily 100 tablet 0    atorvastatin (LIPITOR) 20 MG tablet Take 1 tablet (20 mg) by mouth at bedtime 90 tablet 3    B Complex Vitamins (VITAMIN B COMPLEX PO)       PARoxetine (PAXIL) 30 MG tablet Take 1 tablet (30 mg) by mouth every morning 90 tablet 3    clopidogrel (PLAVIX) 75 MG tablet Take 1 tablet (75  mg) by mouth daily (Patient not taking: Reported on 4/21/2025) 20 tablet 0    magnesium 250 MG tablet Take 1 tablet by mouth daily (Patient not taking: Reported on 4/21/2025)      metoprolol tartrate (LOPRESSOR) 25 MG tablet TAKE 1/2 TABLET (12.5MG) BY MOUTH TWICE A DAY (Patient not taking: Reported on 4/21/2025)      nitroGLYcerin (NITROSTAT) 0.4 MG sublingual tablet Place 1 tablet (0.4 mg) under the tongue every 5 minutes as needed for chest pain (Patient not taking: Reported on 4/21/2025) 25 tablet 0    potassium 99 MG TABS Take 1 tablet by mouth daily (Patient not taking: Reported on 4/21/2025)      Turmeric 400 MG CAPS Take 1 capsule by mouth daily (Patient not taking: Reported on 4/21/2025)       Allergies   Allergen Reactions    Meperidine Hcl Anxiety, Diarrhea, Dizziness and Headache    Other Environmental Allergy Unknown     Past medical history, past surgical history, current medications and allergies reviewed and accurate to the best of my knowledge.      ROS:  Refer to HPI    /68   Pulse 62   Temp 98.3  F (36.8  C) (Tympanic)   Resp 20   Ht 1.524 m (5')   Wt 86.6 kg (191 lb)   LMP  (LMP Unknown)   SpO2 94%   BMI 37.30 kg/m      EXAM:  General Appearance: Well appearing 59 year old female, appropriate appearance for age. No acute distress   Respiratory: normal chest wall and respirations.  Normal effort.  Clear to auscultation bilaterally, no wheezing, crackles or rhonchi.  No increased work of breathing.  No cough appreciated.  Cardiac: RRR with no murmurs  Musculoskeletal:  Equal movement of bilateral upper extremities.  Edema noted top, lateral side of right hand.  CMS grossly intact.  Pain upon palpation right lateral hand.  Equal movement of bilateral lower extremities.  Normal gait.    Neuro: Alert and oriented to person, place, and time.    Psychological: normal affect, alert, oriented, and pleasant.     Xray:  Results for orders placed or performed in visit on 04/21/25   XR Hand  Right G/E 3 Views     Status: None    Narrative    XR HAND RIGHT G/E 3 VIEWS    HISTORY: 59 years Female Fall, initial encounter; Right hand pain    COMPARISON: None    TECHNIQUE: 3 views right hand    FINDINGS: There is a fracture of the base of the fifth metacarpal.  There is adjacent soft tissue edema. The fracture involves the  proximal articular surface.      Impression    IMPRESSION: Nondisplaced fracture of the fifth metacarpal base. There  is intra-articular involvement. There is adjacent soft tissue edema.    CARLOS ALBERTO RAMIREZ MD         SYSTEM ID:  [id number]   XR Wrist Right G/E 3 Views     Status: None    Narrative    XR WRIST RIGHT G/E 3 VIEWS    HISTORY: 59 years Female Fall, initial encounter; Right hand pain    COMPARISON: None    TECHNIQUE: Right wrist 3 views    FINDINGS: There is a nondisplaced fracture involving the proximal  fifth metacarpal. There is adjacent soft tissue edema.    There is a cortical lucency involving the proximal articular surface  of the lunate. The radiocarpal joint is congruent.      Impression    IMPRESSION: Nondisplaced fracture of the base of the fifth metacarpal  with associated soft tissue edema.    Subcortical cyst of the proximal lunate.    CARLOS ALBERTO RAMIREZ MD         SYSTEM ID:  [id number]

## 2025-04-21 NOTE — NURSING NOTE
Chief Complaint   Patient presents with    Hand Injury     Right       Patient here for right hand injury x9 days after falling.     Initial /68   Pulse 62   Temp 98.3  F (36.8  C) (Tympanic)   Resp 20   Ht 1.524 m (5')   Wt 86.6 kg (191 lb)   LMP  (LMP Unknown)   SpO2 94%   BMI 37.30 kg/m   Estimated body mass index is 37.3 kg/m  as calculated from the following:    Height as of this encounter: 1.524 m (5').    Weight as of this encounter: 86.6 kg (191 lb).  Medication Review: complete    The next two questions are to help us understand your food security.  If you are feeling you need any assistance in this area, we have resources available to support you today.          4/21/2025   SDOH- Food Insecurity   Within the past 12 months, did you worry that your food would run out before you got money to buy more? N   Within the past 12 months, did the food you bought just not last and you didn t have money to get more? N         Health Care Directive:  Patient does not have a Health Care Directive: Discussed advance care planning with patient; however, patient declined at this time.    Kesha Ramos LPN

## 2025-04-30 ENCOUNTER — OFFICE VISIT (OUTPATIENT)
Dept: FAMILY MEDICINE | Facility: OTHER | Age: 60
End: 2025-04-30
Payer: COMMERCIAL

## 2025-04-30 ENCOUNTER — HOSPITAL ENCOUNTER (OUTPATIENT)
Dept: GENERAL RADIOLOGY | Facility: OTHER | Age: 60
Discharge: HOME | End: 2025-04-30
Attending: FAMILY MEDICINE
Payer: COMMERCIAL

## 2025-04-30 VITALS
WEIGHT: 189 LBS | SYSTOLIC BLOOD PRESSURE: 120 MMHG | OXYGEN SATURATION: 96 % | HEIGHT: 60 IN | TEMPERATURE: 96.7 F | BODY MASS INDEX: 37.11 KG/M2 | DIASTOLIC BLOOD PRESSURE: 72 MMHG | RESPIRATION RATE: 20 BRPM | HEART RATE: 60 BPM

## 2025-04-30 DIAGNOSIS — M65.321 ACQUIRED TRIGGER FINGER OF RIGHT INDEX FINGER: ICD-10-CM

## 2025-04-30 DIAGNOSIS — S62.346A NONDISPLACED FRACTURE OF BASE OF FIFTH METACARPAL BONE, RIGHT HAND, INITIAL ENCOUNTER FOR CLOSED FRACTURE: Primary | ICD-10-CM

## 2025-04-30 PROCEDURE — 73130 X-RAY EXAM OF HAND: CPT | Mod: RT

## 2025-04-30 PROCEDURE — 73130 X-RAY EXAM OF HAND: CPT | Mod: 26 | Performed by: RADIOLOGY

## 2025-04-30 ASSESSMENT — PAIN SCALES - GENERAL: PAINLEVEL_OUTOF10: SEVERE PAIN (10)

## 2025-04-30 NOTE — PROGRESS NOTES
Sports Medicine Office Note    HPI:  59-year-old female coming in for evaluation of a right hand injury that occurred on 4/19.  She tripped, possibly landing on an outstretched hand, though she does not recall the exact mechanism of her injury.  She had immediate pain.  [facility]  X-rays revealed a fracture to the base of the fifth metacarpal.  She was placed in a neutral wrist brace.  She continues to have pain that she endorses is dull, sharp, aching, and throbbing.  She did initially had some significant bruising and swelling, this has significantly improved.  She has been using Tylenol to help with her symptoms.      EXAM:  /72 (BP Location: Left arm, Patient Position: Sitting, Cuff Size: Adult Large)   Pulse 60   Temp (!) 96.7  F (35.9  C) (Temporal)   Resp 20   Ht 1.524 m (5')   Wt 85.7 kg (189 lb)   LMP  (LMP Unknown)   SpO2 96%   BMI 36.91 kg/m    MUSCULOSKELETAL EXAM:  RIGHT HAND  Inspection:  -No gross deformity  -Mild residual swelling  -Scars:  None    Tenderness to palpation of the:  -Ulnar styloid:  Negative  -Distal radius:  Negative  -Alethea's tubercle:  Negative  -Scapholunate ligament:  Negative  -Scaphoid in anatomical snuffbox:  Negative  -1st CMC joint:  Negative  -1st MCP joint:  Negative  -Metacarpals: Positive at the base of the fifth metacarpal    Range of Motion:  -Able to fully extend fingers  -Difficulty fully flexing 2nd-5th digits    Strength:  - strength:  4/5    Sensation:  -Intact to light touch in the radial, median, and ulnar nerve distributions    Motor:  -Intact AIN, PIN, and IO    Special Tests:  -Evaluation for rotational deformity: Negative  -Evaluation for triggering: Positive of the second digit    Other:  -No signs of cyanosis. Normal skin temperature of the upper extremity.  -Elbow:  No gross deformity. Full range of motion.  -Left hand:  No gross deformity. No palpable tenderness. Normal strength and  ROM.      IMAGIN2025: 3 view right hand x-ray  - Nondisplaced oblique fracture through the base of the fifth metacarpal extending into the CMC joint without significant step-off.    2025: 3 view right wrist x-ray  - Base of the fifth metacarpal fracture is better noted on these radiographs.  Lunate cyst.    2025: 3 view right hand x-ray  - Fracture at the base of the fifth metacarpal is not as well-visualized.  No change in bony alignment.      ASSESSMENT/PLAN:  Diagnoses and all orders for this visit:  Nondisplaced fracture of base of fifth metacarpal bone, right hand, initial encounter for closed fracture  -     Orthopedic  Referral  -     XR Hand Right G/E 3 Views  -     CLOSED TX METACARPAL FX EACH, W/O MANIP  Acquired trigger finger of right index finger    59-year-old female with a closed, nondisplaced, intra-articular right fifth metacarpal fracture.  X-rays from  and  were both personally reviewed in the office with the findings as demonstrated above by my interpretation.  She is now 1 week and 4 days out from her injury.  She meets criteria for nonoperative management.  - Discussed immobilization options, recommend cast  - After thorough review of immobilization options, patient elects to continue in the neutral wrist brace, she understands that this is a suboptimal immobilization option for this type of fracture  - Recommend immobilization for approximately 5 more weeks  - Typically would recommend follow-up radiographs to visualize fracture healing  - Patient has elected to return on an as needed basis if she is having concerns through the healing process.    Right second trigger finger:  Patient has known triggering.  She has a history of previous trigger finger release.  - Follow-up as needed  - Given history, will likely need to see hand surgery for definitive management      Qamar Marti MD  2025  8:49 AM    Total time spent with this patient was 23 minutes  which included chart review, visualization and independent interpretation of images, time spent with the patient, and documentation.    Procedure time:  0 minute(s)

## 2025-05-06 DIAGNOSIS — G45.9 TIA (TRANSIENT ISCHEMIC ATTACK): ICD-10-CM

## 2025-05-06 DIAGNOSIS — F41.9 ANXIETY: ICD-10-CM

## 2025-05-09 NOTE — TELEPHONE ENCOUNTER
Requested Prescriptions   Pending Prescriptions Disp Refills    atorvastatin (LIPITOR) 20 MG tablet [Pharmacy Med Name: ATORVASTATIN 20MG TABLET] 90 tablet 3     Sig: TAKE 1 TABLET (20 MG) BY MOUTH AT BEDTIME       Antihyperlipidemic agents Failed - 5/9/2025  8:54 AM        Failed - LDL on file in the past 12 months        Failed - Recent (12 mo) or future (90 days) visit within the authorizing provider's specialty     The patient must have completed an in-person or virtual visit within the past 12 months or has a future visit scheduled within the next 90 days with the authorizing provider s specialty.  Urgent care and e-visits do not qualify as an office visit for this protocol.          Passed - Medication is active on med list and the sig matches. RN to manually verify dose and sig if red X/fail.     If the protocol passes (green check), you do not need to verify med dose and sig.    A prescription matches if they are the same clinical intention.    For Example: once daily and every morning are the same.    The protocol can not identify upper and lower case letters as matching and will fail.     For Example: Take 1 tablet (50 mg) by mouth daily     TAKE 1 TABLET (50 MG) BY MOUTH DAILY    For all fails (red x), verify dose and sig.    If the refill does match what is on file, the RN can still proceed to approve the refill request.       If they do not match, route to the appropriate provider.             Passed - Patient is age 18 years or older        Passed - No active pregnancy on record        Passed - No positive pregnancy test in past 12 mos   Last Prescription Date:   5/3/2024  Last Fill Qty/Refills:         90, R-3        PARoxetine (PAXIL) 30 MG tablet [Pharmacy Med Name: PAROXETINE 30MG TABLET] 90 tablet 3     Sig: TAKE 1 TABLET (30 MG) BY MOUTH EVERY MORNING       SSRIs Protocol Failed - 5/9/2025  8:54 AM        Failed - ELIGIO-7 score of less than 5 in past 6 months.     Please review last ELIGIO-7 score.         No data to display                      Failed - Recent (12 mo) or future (90 days) visit within the authorizing provider's specialty     The patient must have completed an in-person or virtual visit within the past 12 months or has a future visit scheduled within the next 90 days with the authorizing provider s specialty.  Urgent care and e-visits do not qualify as an office visit for this protocol.          Passed - Medication is active on med list and the sig matches. RN to manually verify dose and sig if red X/fail.     If the protocol passes (green check), you do not need to verify med dose and sig.    A prescription matches if they are the same clinical intention.    For Example: once daily and every morning are the same.    The protocol can not identify upper and lower case letters as matching and will fail.     For Example: Take 1 tablet (50 mg) by mouth daily     TAKE 1 TABLET (50 MG) BY MOUTH DAILY    For all fails (red x), verify dose and sig.    If the refill does match what is on file, the RN can still proceed to approve the refill request.       If they do not match, route to the appropriate provider.             Passed - Medication indicated for associated diagnosis     Medication is associated with one or more of the following diagnoses:              Anxiety             Bipolar  Depression  Obsessive-compulsive disorder             Panic disorder  Postmenopausal flushing             Premenstrual dysphoric disorder             Social phobia   Adjustment disorder with depressed mood   Mood disorder   Adjustment disorder with anxious mood          Passed - Patient is age 18 or older        Passed - No active pregnancy on record        Passed - No positive pregnancy test in last 12 months      Last Prescription Date:   5/3/2024  Last Fill Qty/Refills:         90, R-3      Primary care provider: Alejandra Helms NP    LOV: 4/19/2024 ([facility])  Future Office visit:   No appointment scheduled at this  time.    Overdue for an office visit.     Routing refill request to provider for review/approval.    Nevaeh Quarles RN  ....................  5/9/2025   8:54 AM

## 2025-05-13 RX ORDER — PAROXETINE 30 MG/1
30 TABLET, FILM COATED ORAL EVERY MORNING
Qty: 90 TABLET | Refills: 0 | Status: SHIPPED | OUTPATIENT
Start: 2025-05-13

## 2025-05-13 RX ORDER — ATORVASTATIN CALCIUM 20 MG/1
20 TABLET, FILM COATED ORAL AT BEDTIME
Qty: 90 TABLET | Refills: 0 | Status: SHIPPED | OUTPATIENT
Start: 2025-05-13

## 2025-05-13 NOTE — TELEPHONE ENCOUNTER
Herber Magaña sent Rx request for the following:      Requested Prescriptions   Pending Prescriptions Disp Refills    atorvastatin (LIPITOR) 20 MG tablet [Pharmacy Med Name: ATORVASTATIN 20MG TABLET] 90 tablet 3     Sig: TAKE 1 TABLET (20 MG) BY MOUTH AT BEDTIME       Antihyperlipidemic agents Failed - 5/13/2025 12:55 PM        Failed - LDL on file in the past 12 months        Failed - Recent (12 mo) or future (90 days) visit within the authorizing provider's specialty     The patient must have completed an in-person or virtual visit within the past 12 months or has a future visit scheduled within the next 90 days with the authorizing provider s specialty.  Urgent care and e-visits do not qualify as an office visit for this protocol.          Passed - Medication is active on med list and the sig matches. RN to manually verify dose and sig if red X/fail.     If the protocol passes (green check), you do not need to verify med dose and sig.    A prescription matches if they are the same clinical intention.    For Example: once daily and every morning are the same.    The protocol can not identify upper and lower case letters as matching and will fail.     For Example: Take 1 tablet (50 mg) by mouth daily     TAKE 1 TABLET (50 MG) BY MOUTH DAILY    For all fails (red x), verify dose and sig.    If the refill does match what is on file, the RN can still proceed to approve the refill request.       If they do not match, route to the appropriate provider.             Passed - Patient is age 18 years or older        Passed - No active pregnancy on record        Passed - No positive pregnancy test in past 12 mos          PARoxetine (PAXIL) 30 MG tablet [Pharmacy Med Name: PAROXETINE 30MG TABLET] 90 tablet 3     Sig: TAKE 1 TABLET (30 MG) BY MOUTH EVERY MORNING       SSRIs Protocol Failed - 5/13/2025 12:55 PM        Failed - ELIGIO-7 score of less than 5 in past 6 months.     Please review last ELIGIO-7 score.        No data to  display                      Failed - Recent (12 mo) or future (90 days) visit within the authorizing provider's specialty     The patient must have completed an in-person or virtual visit within the past 12 months or has a future visit scheduled within the next 90 days with the authorizing provider s specialty.  Urgent care and e-visits do not qualify as an office visit for this protocol.          Passed - Medication is active on med list and the sig matches. RN to manually verify dose and sig if red X/fail.     If the protocol passes (green check), you do not need to verify med dose and sig.    A prescription matches if they are the same clinical intention.    For Example: once daily and every morning are the same.    The protocol can not identify upper and lower case letters as matching and will fail.     For Example: Take 1 tablet (50 mg) by mouth daily     TAKE 1 TABLET (50 MG) BY MOUTH DAILY    For all fails (red x), verify dose and sig.    If the refill does match what is on file, the RN can still proceed to approve the refill request.       If they do not match, route to the appropriate provider.             Passed - Medication indicated for associated diagnosis     Medication is associated with one or more of the following diagnoses:              Anxiety             Bipolar  Depression  Obsessive-compulsive disorder             Panic disorder  Postmenopausal flushing             Premenstrual dysphoric disorder             Social phobia   Adjustment disorder with depressed mood   Mood disorder   Adjustment disorder with anxious mood          Passed - Patient is age 18 or older        Passed - No active pregnancy on record        Passed - No positive pregnancy test in last 12 months

## 2025-05-13 NOTE — TELEPHONE ENCOUNTER
Received another refill request from [facility] pharmacy.  Called and left message for patient to return call to verify PCP.  Olena Borjas RN on 5/13/2025 at 10:15 AM

## 2025-05-17 ENCOUNTER — HEALTH MAINTENANCE LETTER (OUTPATIENT)
Age: 60
End: 2025-05-17

## 2025-05-29 NOTE — PROGRESS NOTES
Assessment & Plan     Nondisplaced fracture of base of fifth metacarpal bone, right hand, initial encounter for closed fracture  Has been in brace for 6 weeks, per orthopedic recommendations okay to remove brace at this time.  If continues struggle with pain or difficulties with limited range of motion consider possible physical therapy.  Offered reimaging today which patient deferred.    TIA (transient ischemic attack)  Mixed hyperlipidemia  Refilled statin as below.  Lipid panel ordered, patient would like to return when she is fasting.  She will schedule lab only appointment at her convenience.  - atorvastatin (LIPITOR) 20 MG tablet; Take 1 tablet (20 mg) by mouth at bedtime.  - Lipid Panel; Future    Anxiety  Chronic, stable.  Refilled as below.  - PARoxetine (PAXIL) 30 MG tablet; Take 1 tablet (30 mg) by mouth every morning.    Mounika Aguirre is a 59 year old, presenting for the following health issues:  RECHECK, Fatigue, and Recheck Medication    History of Present Illness       Reason for visit:  Prescription updates,hand fractures follow up,general review    She eats 0-1 servings of fruits and vegetables daily.She consumes 0 sweetened beverage(s) daily.She exercises with enough effort to increase her heart rate 20 to 29 minutes per day.  She exercises with enough effort to increase her heart rate 4 days per week.   She is taking medications regularly.      Here for follow-up on right hand fracture.  Patient sustained a fall in the end of April.  Initially seen on 4/21 in the [facility] where she was diagnosed with nondisplaced fracture of base of fifth metacarpal.  Followed up with sports medicine on 4/30 where cast was recommended however patient preferred to continue with neutral wrist brace.  They recommended immobilization for 5 more weeks as well as considering follow-up radiographs to visualize fracture healing.  Patient reports she has been feeling much improved.  Still having some  difficulties when putting pressure on the area or when lifting heavier objects but overall is wondering if she is able to remove the brace at this time.    Also continues on atorvastatin for history of hyperlipidemia and TIA.  Lipids last checked in 2/2024 and were stable with slightly elevated triglycerides at 250, otherwise all lipids within normal limits.  She also continues on paroxetine for management of anxiety.  Overall is felt well-controlled.  Needing refills of both.      PAST MEDICAL HISTORY:   Past Medical History:   Diagnosis Date    Family history of malignant neoplasm of digestive organ     10/2/2014    Personal history of other medical treatment (CODE)     No Comments Provided       PAST SURGICAL HISTORY:   Past Surgical History:   Procedure Laterality Date    APPENDECTOMY OPEN      1985    CHOLECYSTECTOMY      1987    COLONOSCOPY  10/07/2014    hyperplastic polyp--repeat 10 years    COLONOSCOPY  05/23/2022    10 yr follow up    LAPAROSCOPIC TUBAL LIGATION      No Comments Provided    OTHER SURGICAL HISTORY      06/1997,[id number],SPINAL FUSION,L3/L4       FAMILY HISTORY:   Family History   Problem Relation Age of Onset    Colon Cancer Maternal Grandfather         Cancer-colon    Colon Cancer Maternal Aunt         Cancer-colon    Colon Cancer Maternal Aunt         Cancer-colon    Breast Cancer No family hx of         Cancer-breast       SOCIAL HISTORY:   Social History     Tobacco Use    Smoking status: Former    Smokeless tobacco: Never    Tobacco comments:     Quit in 2009    Substance Use Topics    Alcohol use: Not Currently     Comment: Alcoholic Drinks/day        Allergies   Allergen Reactions    Meperidine Hcl Anxiety, Diarrhea, Dizziness and Headache    Other Environmental Allergy Unknown     Current Outpatient Medications   Medication Sig Dispense Refill    acetaminophen (TYLENOL) 500 MG tablet Take 1,000 mg by mouth every 6 hours      aspirin (ASA) 325 MG EC tablet Take 1 tablet (325 mg) by  "mouth daily 100 tablet 0    atorvastatin (LIPITOR) 20 MG tablet Take 1 tablet (20 mg) by mouth at bedtime. 90 tablet 4    B Complex Vitamins (VITAMIN B COMPLEX PO)       magnesium 250 MG tablet Take 1 tablet by mouth daily.      nitroGLYcerin (NITROSTAT) 0.4 MG sublingual tablet Place 1 tablet (0.4 mg) under the tongue every 5 minutes as needed for chest pain 25 tablet 0    PARoxetine (PAXIL) 30 MG tablet Take 1 tablet (30 mg) by mouth every morning. 90 tablet 4    potassium 99 MG TABS Take 1 tablet by mouth daily.      Turmeric 400 MG CAPS Take 1 capsule by mouth daily.      clopidogrel (PLAVIX) 75 MG tablet Take 1 tablet (75 mg) by mouth daily (Patient not taking: Reported on 6/2/2025) 20 tablet 0    metoprolol tartrate (LOPRESSOR) 25 MG tablet  (Patient not taking: Reported on 6/2/2025)       No current facility-administered medications for this visit.           Objective    /66   Pulse 66   Temp 97.8  F (36.6  C) (Tympanic)   Resp 18   Ht 1.549 m (5' 1\")   Wt 85.7 kg (189 lb)   LMP  (LMP Unknown)   SpO2 96%   BMI 35.71 kg/m    Body mass index is 35.71 kg/m .  Physical Exam   General: Pleasant, in no apparent distress.  Musculoskeletal: Right neutral wrist brace in place.  No significant limited range of motion of fingers of right.  Skin: No jaundice, pallor, rashes, or lesions.  Psych: Appropriate mood and affect.    No results found for any visits on 06/02/25.    Signed Electronically by: Salma Quiroz PA-C    "

## 2025-06-02 ENCOUNTER — OFFICE VISIT (OUTPATIENT)
Dept: FAMILY MEDICINE | Facility: OTHER | Age: 60
End: 2025-06-02
Attending: PHYSICIAN ASSISTANT
Payer: COMMERCIAL

## 2025-06-02 VITALS
WEIGHT: 189 LBS | HEIGHT: 61 IN | HEART RATE: 66 BPM | RESPIRATION RATE: 18 BRPM | OXYGEN SATURATION: 96 % | DIASTOLIC BLOOD PRESSURE: 66 MMHG | SYSTOLIC BLOOD PRESSURE: 104 MMHG | BODY MASS INDEX: 35.68 KG/M2 | TEMPERATURE: 97.8 F

## 2025-06-02 DIAGNOSIS — F41.9 ANXIETY: ICD-10-CM

## 2025-06-02 DIAGNOSIS — G45.9 TIA (TRANSIENT ISCHEMIC ATTACK): ICD-10-CM

## 2025-06-02 DIAGNOSIS — S62.346A NONDISPLACED FRACTURE OF BASE OF FIFTH METACARPAL BONE, RIGHT HAND, INITIAL ENCOUNTER FOR CLOSED FRACTURE: Primary | ICD-10-CM

## 2025-06-02 DIAGNOSIS — E78.2 MIXED HYPERLIPIDEMIA: ICD-10-CM

## 2025-06-02 RX ORDER — ATORVASTATIN CALCIUM 20 MG/1
20 TABLET, FILM COATED ORAL AT BEDTIME
Qty: 90 TABLET | Refills: 4 | Status: SHIPPED | OUTPATIENT
Start: 2025-06-02

## 2025-06-02 RX ORDER — PAROXETINE 30 MG/1
30 TABLET, FILM COATED ORAL EVERY MORNING
Qty: 90 TABLET | Refills: 4 | Status: SHIPPED | OUTPATIENT
Start: 2025-06-02

## 2025-06-02 ASSESSMENT — PAIN SCALES - GENERAL: PAINLEVEL_OUTOF10: NO PAIN (0)

## (undated) RX ORDER — CLOPIDOGREL BISULFATE 75 MG/1
TABLET ORAL
Status: DISPENSED
Start: 2024-02-28

## (undated) RX ORDER — ACETAMINOPHEN 500 MG
TABLET ORAL
Status: DISPENSED
Start: 2024-02-27